# Patient Record
Sex: FEMALE | Race: WHITE | NOT HISPANIC OR LATINO | Employment: FULL TIME | URBAN - METROPOLITAN AREA
[De-identification: names, ages, dates, MRNs, and addresses within clinical notes are randomized per-mention and may not be internally consistent; named-entity substitution may affect disease eponyms.]

---

## 2023-07-29 ASSESSMENT — PROMIS GLOBAL HEALTH SCALE
CARRYOUT_PHYSICAL_ACTIVITIES: COMPLETELY
CARRYOUT_SOCIAL_ACTIVITIES: VERY GOOD
RATE_QUALITY_OF_LIFE: VERY GOOD
RATE_AVERAGE_FATIGUE: MILD
EMOTIONAL_PROBLEMS: RARELY
RATE_AVERAGE_PAIN: 2
RATE_MENTAL_HEALTH: VERY GOOD
RATE_GENERAL_HEALTH: VERY GOOD
RATE_SOCIAL_SATISFACTION: VERY GOOD
RATE_PHYSICAL_HEALTH: GOOD

## 2023-07-31 ENCOUNTER — OFFICE VISIT (OUTPATIENT)
Dept: PRIMARY CARE | Facility: CLINIC | Age: 48
End: 2023-07-31
Payer: COMMERCIAL

## 2023-07-31 VITALS — SYSTOLIC BLOOD PRESSURE: 121 MMHG | DIASTOLIC BLOOD PRESSURE: 71 MMHG | BODY MASS INDEX: 37.12 KG/M2 | WEIGHT: 237 LBS

## 2023-07-31 DIAGNOSIS — R73.02 GLUCOSE INTOLERANCE (IMPAIRED GLUCOSE TOLERANCE): ICD-10-CM

## 2023-07-31 DIAGNOSIS — E78.2 MIXED HYPERLIPIDEMIA: ICD-10-CM

## 2023-07-31 DIAGNOSIS — Z00.00 HEALTH CARE MAINTENANCE: Primary | ICD-10-CM

## 2023-07-31 DIAGNOSIS — Z12.31 VISIT FOR SCREENING MAMMOGRAM: ICD-10-CM

## 2023-07-31 LAB
ANION GAP IN SER/PLAS: 12 MMOL/L (ref 10–20)
CALCIUM (MG/DL) IN SER/PLAS: 9.5 MG/DL (ref 8.6–10.6)
CARBON DIOXIDE, TOTAL (MMOL/L) IN SER/PLAS: 29 MMOL/L (ref 21–32)
CHLORIDE (MMOL/L) IN SER/PLAS: 106 MMOL/L (ref 98–107)
CHOLESTEROL (MG/DL) IN SER/PLAS: 165 MG/DL (ref 0–199)
CHOLESTEROL IN HDL (MG/DL) IN SER/PLAS: 54.2 MG/DL
CHOLESTEROL/HDL RATIO: 3
CREATININE (MG/DL) IN SER/PLAS: 0.74 MG/DL (ref 0.5–1.05)
ERYTHROCYTE DISTRIBUTION WIDTH (RATIO) BY AUTOMATED COUNT: 12.4 % (ref 11.5–14.5)
ERYTHROCYTE MEAN CORPUSCULAR HEMOGLOBIN CONCENTRATION (G/DL) BY AUTOMATED: 31.6 G/DL (ref 32–36)
ERYTHROCYTE MEAN CORPUSCULAR VOLUME (FL) BY AUTOMATED COUNT: 95 FL (ref 80–100)
ERYTHROCYTES (10*6/UL) IN BLOOD BY AUTOMATED COUNT: 4.79 X10E12/L (ref 4–5.2)
ESTIMATED AVERAGE GLUCOSE FOR HBA1C: 103 MG/DL
GFR FEMALE: >90 ML/MIN/1.73M2
GLUCOSE (MG/DL) IN SER/PLAS: 92 MG/DL (ref 74–99)
HEMATOCRIT (%) IN BLOOD BY AUTOMATED COUNT: 45.3 % (ref 36–46)
HEMOGLOBIN (G/DL) IN BLOOD: 14.3 G/DL (ref 12–16)
HEMOGLOBIN A1C/HEMOGLOBIN TOTAL IN BLOOD: 5.2 %
LDL: 73 MG/DL (ref 0–99)
LEUKOCYTES (10*3/UL) IN BLOOD BY AUTOMATED COUNT: 6.3 X10E9/L (ref 4.4–11.3)
NRBC (PER 100 WBCS) BY AUTOMATED COUNT: 0 /100 WBC (ref 0–0)
PLATELETS (10*3/UL) IN BLOOD AUTOMATED COUNT: 234 X10E9/L (ref 150–450)
POTASSIUM (MMOL/L) IN SER/PLAS: 4.3 MMOL/L (ref 3.5–5.3)
SODIUM (MMOL/L) IN SER/PLAS: 143 MMOL/L (ref 136–145)
TRIGLYCERIDE (MG/DL) IN SER/PLAS: 188 MG/DL (ref 0–149)
UREA NITROGEN (MG/DL) IN SER/PLAS: 9 MG/DL (ref 6–23)
VLDL: 38 MG/DL (ref 0–40)

## 2023-07-31 PROCEDURE — 85027 COMPLETE CBC AUTOMATED: CPT

## 2023-07-31 PROCEDURE — 83036 HEMOGLOBIN GLYCOSYLATED A1C: CPT

## 2023-07-31 PROCEDURE — 80061 LIPID PANEL: CPT

## 2023-07-31 PROCEDURE — 93000 ELECTROCARDIOGRAM COMPLETE: CPT | Performed by: INTERNAL MEDICINE

## 2023-07-31 PROCEDURE — 80048 BASIC METABOLIC PNL TOTAL CA: CPT

## 2023-07-31 PROCEDURE — 99396 PREV VISIT EST AGE 40-64: CPT | Performed by: INTERNAL MEDICINE

## 2023-07-31 NOTE — PROGRESS NOTES
Subjective   Patient ID: Ariana De Santiago is a 47 y.o. female who presents for No chief complaint on file..    HPI CPE see updated front sheet no chest pain no shortness of breath no polyuria polydipsia no side effect with over-the-counter medications including vitamins bowels normal no dysuria    Past medical history hyperlipidemia    Medications multivitamin    Allergies noted and unchanged    Social history no tobacco    Family history both parents with diabetes and hypertension    Prevention exercises especially during the summertime some prior blood work reviewed including slight elevations in triglycerides and glucose due for mammogram    Review of Systems    Objective   There were no vitals taken for this visit.    Physical Exam vital signs noted alert and oriented x3 NCAT PERRLA EOMI nares without discharge OP benign TM normal bilateral EAC clear bilateral no AC nodes no JVD or bruit no thyromegaly chest clear to auscultation and percussion CV regular rate and rhythm S1-S2 without murmur gallop or rub abdomen soft obese nontender normal active bowel sounds LS spine normal curvature negative straight leg raise negative logroll negative SI joint tenderness extremities no clubbing cyanosis or edema normal distal pulses DTR 2+    Assessment/Plan impression General medical examination hyperlipidemia glucose intolerance obesity  Plan also was having some headaches she thought from COVID they are treated with a simple Motrin and are okay mostly towards the left side no visual changes also with occasional dyspepsia takes a Tums seems to be okay also she thought perhaps from COVID check EKG advised on heart diet weight loss exercise increase water consumption check Chem-7 advised on glucose potassium and kidney function okay for mammogram requisition made check CBC advised on blood count check A1c advised on long-term blood sugar test check lipid panel advised on cholesterol profile and triglycerides follow-up based on  labs TT 60 cc 31

## 2023-10-30 ENCOUNTER — TELEMEDICINE CLINICAL SUPPORT (OUTPATIENT)
Dept: PREADMISSION TESTING | Facility: HOSPITAL | Age: 48
End: 2023-10-30
Payer: COMMERCIAL

## 2023-11-03 ENCOUNTER — PRE-ADMISSION TESTING (OUTPATIENT)
Dept: PREADMISSION TESTING | Facility: HOSPITAL | Age: 48
End: 2023-11-03
Payer: COMMERCIAL

## 2023-11-03 VITALS
TEMPERATURE: 97.8 F | HEIGHT: 67 IN | SYSTOLIC BLOOD PRESSURE: 101 MMHG | OXYGEN SATURATION: 97 % | DIASTOLIC BLOOD PRESSURE: 69 MMHG | WEIGHT: 237.22 LBS | HEART RATE: 75 BPM | BODY MASS INDEX: 37.23 KG/M2

## 2023-11-03 DIAGNOSIS — Z01.818 PREOPERATIVE TESTING: Primary | ICD-10-CM

## 2023-11-03 LAB
ABO GROUP (TYPE) IN BLOOD: NORMAL
ANION GAP SERPL CALC-SCNC: 15 MMOL/L (ref 10–20)
ANTIBODY SCREEN: NORMAL
BUN SERPL-MCNC: 10 MG/DL (ref 6–23)
CALCIUM SERPL-MCNC: 10 MG/DL (ref 8.6–10.6)
CHLORIDE SERPL-SCNC: 104 MMOL/L (ref 98–107)
CO2 SERPL-SCNC: 27 MMOL/L (ref 21–32)
CREAT SERPL-MCNC: 0.58 MG/DL (ref 0.5–1.05)
ERYTHROCYTE [DISTWIDTH] IN BLOOD BY AUTOMATED COUNT: 11.9 % (ref 11.5–14.5)
GFR SERPL CREATININE-BSD FRML MDRD: >90 ML/MIN/1.73M*2
GLUCOSE SERPL-MCNC: 85 MG/DL (ref 74–99)
HCT VFR BLD AUTO: 43.9 % (ref 36–46)
HGB BLD-MCNC: 14.2 G/DL (ref 12–16)
MCH RBC QN AUTO: 30 PG (ref 26–34)
MCHC RBC AUTO-ENTMCNC: 32.3 G/DL (ref 32–36)
MCV RBC AUTO: 93 FL (ref 80–100)
NRBC BLD-RTO: 0 /100 WBCS (ref 0–0)
PLATELET # BLD AUTO: 206 X10*3/UL (ref 150–450)
POTASSIUM SERPL-SCNC: 4.3 MMOL/L (ref 3.5–5.3)
RBC # BLD AUTO: 4.74 X10*6/UL (ref 4–5.2)
RH FACTOR (ANTIGEN D): NORMAL
SODIUM SERPL-SCNC: 142 MMOL/L (ref 136–145)
WBC # BLD AUTO: 5.9 X10*3/UL (ref 4.4–11.3)

## 2023-11-03 PROCEDURE — 86901 BLOOD TYPING SEROLOGIC RH(D): CPT

## 2023-11-03 PROCEDURE — 99204 OFFICE O/P NEW MOD 45 MIN: CPT | Performed by: NURSE PRACTITIONER

## 2023-11-03 PROCEDURE — 85027 COMPLETE CBC AUTOMATED: CPT

## 2023-11-03 PROCEDURE — 82374 ASSAY BLOOD CARBON DIOXIDE: CPT

## 2023-11-03 PROCEDURE — 36415 COLL VENOUS BLD VENIPUNCTURE: CPT

## 2023-11-03 RX ORDER — BISMUTH SUBSALICYLATE 262 MG
1 TABLET,CHEWABLE ORAL DAILY
COMMUNITY

## 2023-11-03 RX ORDER — VIT C/E/ZN/COPPR/LUTEIN/ZEAXAN 250MG-90MG
25 CAPSULE ORAL
COMMUNITY
Start: 2014-06-19

## 2023-11-03 RX ORDER — ASCORBIC ACID 500 MG
500 TABLET ORAL DAILY
COMMUNITY

## 2023-11-03 ASSESSMENT — DUKE ACTIVITY SCORE INDEX (DASI)
CAN YOU PARTICIPATE IN STRENOUS SPORTS LIKE SWIMMING, SINGLES TENNIS, FOOTBALL, BASKETBALL, OR SKIING: YES
CAN YOU RUN A SHORT DISTANCE: YES
CAN YOU PARTICIPATE IN MODERATE RECREATIONAL ACTIVITIES LIKE GOLF, BOWLING, DANCING, DOUBLES TENNIS OR THROWING A BASEBALL OR FOOTBALL: YES
CAN YOU CLIMB A FLIGHT OF STAIRS OR WALK UP A HILL: YES
CAN YOU TAKE CARE OF YOURSELF (EAT, DRESS, BATHE, OR USE TOILET): YES
CAN YOU WALK INDOORS, SUCH AS AROUND YOUR HOUSE: YES
DASI METS SCORE: 9.9
CAN YOU HAVE SEXUAL RELATIONS: YES
CAN YOU DO HEAVY WORK AROUND THE HOUSE LIKE SCRUBBING FLOORS OR LIFTING AND MOVING HEAVY FURNITURE: YES
TOTAL_SCORE: 58.2
CAN YOU DO MODERATE WORK AROUND THE HOUSE LIKE VACUUMING, SWEEPING FLOORS OR CARRYING GROCERIES: YES
CAN YOU DO YARD WORK LIKE RAKING LEAVES, WEEDING OR PUSHING A MOWER: YES
CAN YOU DO LIGHT WORK AROUND THE HOUSE LIKE DUSTING OR WASHING DISHES: YES
CAN YOU WALK A BLOCK OR TWO ON LEVEL GROUND: YES

## 2023-11-03 ASSESSMENT — ENCOUNTER SYMPTOMS
MUSCULOSKELETAL NEGATIVE: 1
NECK NEGATIVE: 1
GASTROINTESTINAL NEGATIVE: 1
CARDIOVASCULAR NEGATIVE: 1
NEUROLOGICAL NEGATIVE: 1
ENDOCRINE NEGATIVE: 1
CONSTITUTIONAL NEGATIVE: 1
EYES NEGATIVE: 1
RESPIRATORY NEGATIVE: 1

## 2023-11-03 ASSESSMENT — CHADS2 SCORE
CHF: NO
DIABETES: NO
HYPERTENSION: NO
AGE GREATER THAN OR EQUAL TO 75: NO
CHADS2 SCORE: 0
PRIOR STROKE OR TIA OR THROMBOEMBOLISM: NO

## 2023-11-03 ASSESSMENT — LIFESTYLE VARIABLES: SMOKING_STATUS: NONSMOKER

## 2023-11-03 NOTE — PREPROCEDURE INSTRUCTIONS
NPO Instructions:    Do not eat any food after midnight the night before your surgery/procedure.  You may have up to TEN ounces of clear liquids until TWO hours before surgery/procedure. This includes water, black tea/coffee, (no milk or cream), apple juice, and/or electrolyte drinks (Gatorade).  Yo may chew gum up to TWO hours before your surgery/procedure.    Additional Instructions:     Avoid herbal supplements, multivitamins and NSAIDS (non-steroidal anti-inflammatory drugs) such as Advil, Aleve, Ibuprofen, Naproxen, Excedrin, Meloxicam or Celebrex for at least 7 days prior to surgery. May take Tylenol as needed.    Seven/Six Days before Surgery:  Review your medication instructions, stop indicated medications    Day of Surgery:  Review your medication instructions, take indicated medications  Wear comfortable loose fitting clothing  Do not use moisturizers, creams, lotions or perfume  All jewelry and valuables should be left at home    Linda Lujan [facility]  [facility]  [phone]

## 2023-11-03 NOTE — CPM/PAT H&P
CPM/PAT Evaluation       Name: Ariana De Santiago (Ariana De Santiago)  /Age: 1975/48 y.o.     Visit Type:   In-Person       Chief Complaint: endometriosis    HPI  The patient is a 48 year old female with endometriosis now s/p TLH-BS and off of NE due to worsened migraines. Recent MRI showed suspicion for left endometrioma, possible right endometrioma vs hemorrhagic cyst. Suspected endometriotic implant vs primary urinary bladder dome neoplasm (though less likely) suspected measuring 1cm at dome of bladder and to left of mildline. Lesion is nodular and extends cephalad, rather than caudally into the dome of the bladder. She is interested in proceeding with surgical intervention. She currently denies fever, chills, n/v, abdominal pain, chest pain, sob, or change in bowel or bladder. She presents today for perioperative evaluation in anticipation of robotic excision of endometriosis on 23 with Dr. Lombardo.    Past Medical History:   Diagnosis Date    Endometriosis 2023    Ob/Gyn: Loren Lombardo, endometriosis s/p TL-BS presents to discuss surgical planning.    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    Family History   Problem Relation Name Age of Onset    Osteoporosis  Mother      Diabetes Father      Heart disease Father         No Known Allergies    Prior to Admission medications    Medication Sig Start Date End Date Taking? Authorizing Provider   cholecalciferol (Vitamin D-3) 25 MCG (1000 UT) capsule Take 1 capsule (25 mcg) by mouth once daily. 6/19/14  Yes Historical Provider, MD   ascorbic acid (Vitamin C) 500 mg tablet Take 1 tablet (500 mg) by mouth once daily.    Historical Provider, MD   multivitamin tablet Take 1 tablet by mouth once daily.    Historical Provider, MD ALLISON ROS:   Constitutional:   neg    Neuro/Psych:   neg    Eyes:    Wears contacts   neg     use of corrective lenses  Ears:   neg    Nose:   neg    Mouth:   neg    Throat:   neg    Neck:   neg    Cardio:   neg    Respiratory:   neg    Endocrine:   neg    GI:   neg    :   neg    Musculoskeletal:   neg    Hematologic:   neg    Skin:  neg        Physical Exam  Vitals reviewed.   Constitutional:       Appearance: Normal appearance. She is obese.   HENT:      Head: Normocephalic and atraumatic.      Nose: Nose normal.      Mouth/Throat:      Mouth: Mucous membranes are moist.      Pharynx: Oropharynx is clear.   Eyes:      Extraocular Movements: Extraocular movements intact.      Conjunctiva/sclera: Conjunctivae normal.      Pupils: Pupils are equal, round, and reactive to light.   Cardiovascular:      Rate and Rhythm: Normal rate and regular rhythm.      Pulses: Normal pulses.      Heart sounds: Normal heart sounds.   Pulmonary:      Effort: Pulmonary effort is normal.      Breath sounds: Normal breath sounds.   Musculoskeletal:         General: Normal range of motion.      Cervical back: Normal range of motion.   Skin:     General: Skin is warm and dry.   Neurological:      General: No focal deficit present.      Mental Status: She is alert and oriented to person, place, and time.   Psychiatric:         Mood and Affect: Mood normal.         Behavior: Behavior normal.          PAT AIRWAY:   Airway:      Mallampati::  I    TM distance::  >3 FB    Neck ROM::  Full  normal        Visit Vitals  /69   Pulse 75   Temp 36.6 °C (97.8 °F) (Oral)       DASI Risk Score      Flowsheet Row Most Recent Value   DASI SCORE 58.2   METS Score (Will be calculated only when all the questions are answered) 9.9          Caprini DVT Assessment      Flowsheet Row Most Recent Value   DVT Score 10   Current Status Major surgery planned, lasting over 3 hours   History Prior major surgery   Age 40-59 years   BMI 31-40 (Obesity)          Modified Frailty Index      Flowsheet Row Most Recent Value   Modified Frailty Index Calculator 0          CHADS2 Stroke Risk  Current as of yesterday        N/A 3 - 100%: High Risk   2 - 3%: Medium Risk   0 - 2%: Low Risk     Last Change: N/A          This score determines the patient's risk of having a stroke if the patient has atrial fibrillation.        This score is not applicable to this patient. Components are not calculated.          Revised Cardiac Risk Index      Flowsheet Row Most Recent Value   Revised Cardiac Risk Calculator 0          Apfel Simplified Score      Flowsheet Row Most Recent Value   Apfel Simplified Score Calculator 3          Risk Analysis Index Results This Encounter    No data found in the last 1 encounters.       Stop Bang Score      Flowsheet Row Most Recent Value   Do you snore loudly? 0   Do you often feel tired or fatigued after your sleep? 0   Has anyone ever observed you stop breathing in your sleep? 0   Do you have or are you being treated for high blood pressure? 0   Recent BMI (Calculated) 38.1   Is BMI greater than 35 kg/m2? 1=Yes   Age older than 50 years old? 0=No   Is your neck circumference greater than 17 inches (Male) or 16 inches (Female)? 1   Gender - Male 0=No   STOP-BANG Total Score 2          Results for orders placed or performed in visit on 11/03/23 (from the past 96 hour(s))   Basic Metabolic Panel   Result Value Ref Range    Glucose 85 74 - 99 mg/dL     Sodium 142 136 - 145 mmol/L    Potassium 4.3 3.5 - 5.3 mmol/L    Chloride 104 98 - 107 mmol/L    Bicarbonate 27 21 - 32 mmol/L    Anion Gap 15 10 - 20 mmol/L    Urea Nitrogen 10 6 - 23 mg/dL    Creatinine 0.58 0.50 - 1.05 mg/dL    eGFR >90 >60 mL/min/1.73m*2    Calcium 10.0 8.6 - 10.6 mg/dL   Type And Screen   Result Value Ref Range    ABO TYPE O     Rh TYPE POS     ANTIBODY SCREEN NEG    CBC   Result Value Ref Range    WBC 5.9 4.4 - 11.3 x10*3/uL    nRBC 0.0 0.0 - 0.0 /100 WBCs    RBC 4.74 4.00 - 5.20 x10*6/uL    Hemoglobin 14.2 12.0 - 16.0 g/dL    Hematocrit 43.9 36.0 - 46.0 %    MCV 93 80 - 100 fL    MCH 30.0 26.0 - 34.0 pg    MCHC 32.3 32.0 - 36.0 g/dL    RDW 11.9 11.5 - 14.5 %    Platelets 206 150 - 450 x10*3/uL        Diagnostic Results      EKG       Assessment and Plan:     Neuro:   The patient has no neurological diagnoses or significant findings on chart review, clinical presentation, and evaluation.  No grossly apparent perioperative risk. The patient is at increased risk for perioperative stroke secondary to female gender, general anesthesia, operative time >2.5 hours.    HEENT/Airway  No diagnoses, significant findings on chart review, clinical presentation, or evaluation.    Cardiovascular  The patient is scheduled for non-cardiac surgery associated with elevated risk.  The patient has no major cardiac contraindications to non- cardiac surgery.  RCRI  The patient meets 0-1 RCRI criteria and therefore has a less than 1% risk of major adverse cardiac complications.  EKG  The patient has no EKG or echocardiographic changes concerning for myocardial ischemia.  No further cardiac evaluation is indicated  Heart Failure  The patient has no known history of heart failure.  Additionally, the patient reports no symptoms of heart failure and demonstrates no signs of heart failure.  Hypertension Evaluation  The patient has no known history of hypertension and has a normal blood pressure today.  Heart Rhythm  Evaluation  The patient has no history of arrhythmias.  Heart Valve Evaluation  The patient has no known history of valvular heart disease. The patient has no symptoms or physical exam findings to suggest valvular heart disease.  CARDS EVAL  The patient is not followed by cardiology.  -The patient has a 30-day risk for MACE of 1 predictor, 6.0% risk for cardiac death, nonfatal myocardial infarction, and nonfatal cardiac arrest  Pulmonary   No significant findings on chart review or clinical presentation and evaluation. The patient is at increased risk of perioperative pulmonary complications secondary to morbid obesity.  The patient has a stop bang score of 2, which places patient at low risk for having MIGUEL.  ARISCAT 23, low, 1.6% risk of in-hospital postoperative pulmonary complications  PRODIGY 3, low of respiratory depression episode.    Hematology  No diagnoses or significant findings on chart review or clinical presentation and evaluation.  Antiplatelet management   The patient is not currently receiving antiplatelet therapy.  Anticoagulation management  The patient is not currently receiving anticoagulation therapy.  Caprini score 10, high risk of VTE  Transfusion Evaluation  A type and screen was obtained given the likelihood for perioperative transfusion of blood or blood products.    GI  No diagnoses or significant findings on chart review or clinical presentation and evaluation.  Eat 10- 0    Genitourinary  No diagnoses or significant findings on chart review or clinical presentation and evaluation.    Renal  The patient has no known history of chronic kidney disease. No renal diagnoses or significant findings on chart review or clinical presentation and evaluation.    Musculoskeletal  No diagnoses or significant findings on chart review or clinical presentation and evaluation.    Endocrine  Diabetes Evaluation  The patient has no history of diabetes mellitus  Thyroid Disease Evaluation  The patient has no  history of thyroid disease.    ID  No diagnoses or significant findings on chart review or clinical presentation and evaluation.    -Preoperative medication instructions were provided and reviewed with the patient.  Any additional testing or evaluation was explained to the patient.  NPO Instructions were discussed, and the patient's questions were answered prior to conclusion of this encounter

## 2023-11-15 ENCOUNTER — ANESTHESIA EVENT (OUTPATIENT)
Dept: OPERATING ROOM | Facility: HOSPITAL | Age: 48
End: 2023-11-15
Payer: COMMERCIAL

## 2023-11-15 NOTE — H&P
"Pre- Surgical History and Physical    Ariana De Santiago is a 48 y.o. P2 ( x2) now s/p TLH-BS presenting for robotic excision of endometriosis     Labs (): Hgb. 14.2, Cr. 0.58     Obhx:  x2  Gynhx: Hysterectomy (2018). Last Pap 2016 negative/negative   PSH: laparoscopy x 3 for endometriosis, TLH-BS. 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 Only peritoneal nodule was resected with regard to endometriosis. Path positive       IMAGING:    MR Pelvis (2023)    FINDINGS:  OVARIES     Right     Size (in mm):  27 mm craniocaudal, 28 x 15. This measurement includes  both cysts detailed below     Vascularity:  Maintained; no evidence of acute fixed torsion     Solid Mass:  Negative     Cysts/s:     18 mm craniocaudal, 19 x 10 mm hemorrhagic cyst. See the impression  of this report     9-10 mm simple, physiologic follicle or cyst     LEFT     Size (in mm):  25 mm craniocaudal, 28 x 22 mm in the axial plane.  This measurement includes both cysts detailed below     Vascularity:  Maintained; no evidence of acute fixed torsion     Solid Mass:  Negative     Cysts/s:     19 mm craniocaudal, 22 x 20 mm hemorrhagic cyst. See the impression  of this report     14 mm simple, physiologic follicle or cyst     OTHER PELVIS     Lymph Nodes:  No pelvic adenopathy     Endometriosis Findings:  Around 1 cm nodular lesion sitting on the mid to posterior bladder dome to the left of the midline could be related to endometriosis. See also the ovaries section, above, and the impression, below     Free Fluid:  None pathologic     Nongynecologic findings:  No acute or contributory abnormality. The  included bowel, the included urinary bladder and vasculature is  within expected limits     IMPRESSION:  ONE FAIRLY SMALL HEMORRHAGIC CYST IN EACH OVARY. THE LARGER IS ON THE  LEFT AND IT IS THE LEFT OVARIAN HEMORRHAGIC CYSTIC LESION THAT HAS  SO-CALLED \"T2 SHADING,\" AS WELL AS POTENTIALLY (NOT NEARLY " "AS  COMPELLING AS THE T2 SHADING) ONE OR TWO \"T2 DARK SPOT SIGN/S,\" BOTH  MR FINDINGS ASSOCIATED WITH ENDOMETRIOMAS. THE SMALLER IS ON THE  RIGHT AND DOES NOT HAVE EITHER OF THOSE FINDINGS, WHICH DOES NOT  EXCLUDE A RIGHT-SIDED ENDOMETRIOMA     AROUND 1 CM NODULAR, ENHANCING ABNORMALITY SITTING ON THE DOME OF THE  URINARY BLADDER TO THE LEFT OF THE MIDLINE MAY BE RELATED TO AN  ENDOMETRIOSIS DEPOSIT. I FIND THE POSSIBILITY OF A PRIMARY URINARY  BLADDER DOME NEOPLASM TO BE LESS LIKELY BASED ON POSITION AND  RELATION TO THE LUMEN (IT PROJECTS UP, CEPHALAD, AWAY FROM THE DOME  OF THE URINARY BLADDER, NOT DOWN, CAUDALLY INTO THE BLADDER LUMEN)     NO OTHER POTENTIAL PELVIC FINDINGS OF ENDOMETRIOSIS     NOTE THAT DUE TO THE PATIENT'S SIZE AND PROTOCOL USED FOR THIS EXAM,  SOME OF THE SUPERFICIAL VENTRAL PELVIC WALL IS NOT INCLUDED IN THE  FIELD OF VIEW, MAKING EVALUATION FOR SCAR ENDOMETRIOSIS DIFFICULT BUT  WITH THE PORTIONS OF THE VENTRAL PELVIC WALL INCLUDED, SCAR  ENDOMETRIOSIS SEEMS UNLIKELY AS THERE IS NO FINDING ASSOCIATED WITH  ENDOMETRIOSIS ANYWHERE NEAR THE RECTUS OR SEVERAL CM SUPERFICIAL TO  THE RECTUS ANYWHERE IN THE FIELD OF VIEW       Obstetrical History   OB History   No obstetric history on file.       Past Medical History  Past Medical History:   Diagnosis Date    Endometriosis 09/21/2023    Ob/Gyn: Loren Lombardo, endometriosis s/p TL-BS presents to discuss surgical planning.    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        Past Surgical History   Past Surgical History:   Procedure Laterality Date    " HYSTERECTOMY      OTHER SURGICAL HISTORY  01/29/2019    Hysterectomy    OTHER SURGICAL HISTORY  11/26/2014    Ostectomy Calcaneus For Spur    OTHER SURGICAL HISTORY  11/26/2014    Gynecologic Laparoscopy With Adhesiolysis       Social History  Social History     Tobacco Use    Smoking status: Never    Smokeless tobacco: Never   Substance Use Topics    Alcohol use: Never     Substance and Sexual Activity   Drug Use Never       Allergies  Patient has no known allergies.     Medications  No medications prior to admission.       Objective    Last Vitals  Temp Pulse Resp BP MAP O2 Sat                   Physical Examination  General: no acute distress  HEENT: normocephalic, atraumatic  CV: warm and well perfused  Lungs: breathing comfortably on room air  Abdomen: NTND  Extremities: moving all extremities spontaneously  Neuro: awake and conversant  Psych: appropriate mood and affect      Lab Review  Labs in chart were reviewed.    To be seen & dw. Dr. Grupo Ibarra MD  PGY-1, Obstetrics & Gynecology   [facility]

## 2023-11-16 ENCOUNTER — HOSPITAL ENCOUNTER (OUTPATIENT)
Facility: HOSPITAL | Age: 48
Setting detail: OUTPATIENT SURGERY
Discharge: HOME | End: 2023-11-16
Attending: STUDENT IN AN ORGANIZED HEALTH CARE EDUCATION/TRAINING PROGRAM | Admitting: STUDENT IN AN ORGANIZED HEALTH CARE EDUCATION/TRAINING PROGRAM
Payer: COMMERCIAL

## 2023-11-16 ENCOUNTER — ANESTHESIA (OUTPATIENT)
Dept: OPERATING ROOM | Facility: HOSPITAL | Age: 48
End: 2023-11-16
Payer: COMMERCIAL

## 2023-11-16 VITALS
BODY MASS INDEX: 37.58 KG/M2 | WEIGHT: 239.42 LBS | HEART RATE: 79 BPM | RESPIRATION RATE: 14 BRPM | SYSTOLIC BLOOD PRESSURE: 130 MMHG | HEIGHT: 67 IN | OXYGEN SATURATION: 95 % | DIASTOLIC BLOOD PRESSURE: 88 MMHG | TEMPERATURE: 97.7 F

## 2023-11-16 DIAGNOSIS — N80.9 ENDOMETRIOSIS, UNSPECIFIED: ICD-10-CM

## 2023-11-16 DIAGNOSIS — N80.9 ENDOMETRIOSIS: Primary | ICD-10-CM

## 2023-11-16 PROBLEM — E66.9 OBESITY: Status: ACTIVE | Noted: 2023-11-16

## 2023-11-16 PROBLEM — Z98.890 HISTORY OF GENERAL ANESTHESIA: Status: ACTIVE | Noted: 2023-11-16

## 2023-11-16 PROCEDURE — 2500000001 HC RX 250 WO HCPCS SELF ADMINISTERED DRUGS (ALT 637 FOR MEDICARE OP)

## 2023-11-16 PROCEDURE — 3600000018 HC OR TIME - INITIAL BASE CHARGE - PROCEDURE LEVEL SIX: Performed by: STUDENT IN AN ORGANIZED HEALTH CARE EDUCATION/TRAINING PROGRAM

## 2023-11-16 PROCEDURE — 2500000001 HC RX 250 WO HCPCS SELF ADMINISTERED DRUGS (ALT 637 FOR MEDICARE OP): Performed by: STUDENT IN AN ORGANIZED HEALTH CARE EDUCATION/TRAINING PROGRAM

## 2023-11-16 PROCEDURE — 2500000004 HC RX 250 GENERAL PHARMACY W/ HCPCS (ALT 636 FOR OP/ED): Performed by: STUDENT IN AN ORGANIZED HEALTH CARE EDUCATION/TRAINING PROGRAM

## 2023-11-16 PROCEDURE — 2500000005 HC RX 250 GENERAL PHARMACY W/O HCPCS

## 2023-11-16 PROCEDURE — 7100000001 HC RECOVERY ROOM TIME - INITIAL BASE CHARGE: Performed by: STUDENT IN AN ORGANIZED HEALTH CARE EDUCATION/TRAINING PROGRAM

## 2023-11-16 PROCEDURE — 49321 LAPAROSCOPY BIOPSY: CPT | Performed by: STUDENT IN AN ORGANIZED HEALTH CARE EDUCATION/TRAINING PROGRAM

## 2023-11-16 PROCEDURE — 96372 THER/PROPH/DIAG INJ SC/IM: CPT

## 2023-11-16 PROCEDURE — 88305 TISSUE EXAM BY PATHOLOGIST: CPT | Mod: TC,SUR | Performed by: STUDENT IN AN ORGANIZED HEALTH CARE EDUCATION/TRAINING PROGRAM

## 2023-11-16 PROCEDURE — S2900 ROBOTIC SURGICAL SYSTEM: HCPCS | Performed by: STUDENT IN AN ORGANIZED HEALTH CARE EDUCATION/TRAINING PROGRAM

## 2023-11-16 PROCEDURE — A4217 STERILE WATER/SALINE, 500 ML: HCPCS | Performed by: STUDENT IN AN ORGANIZED HEALTH CARE EDUCATION/TRAINING PROGRAM

## 2023-11-16 PROCEDURE — 2500000004 HC RX 250 GENERAL PHARMACY W/ HCPCS (ALT 636 FOR OP/ED)

## 2023-11-16 PROCEDURE — 3700000002 HC GENERAL ANESTHESIA TIME - EACH INCREMENTAL 1 MINUTE: Performed by: STUDENT IN AN ORGANIZED HEALTH CARE EDUCATION/TRAINING PROGRAM

## 2023-11-16 PROCEDURE — 7100000002 HC RECOVERY ROOM TIME - EACH INCREMENTAL 1 MINUTE: Performed by: STUDENT IN AN ORGANIZED HEALTH CARE EDUCATION/TRAINING PROGRAM

## 2023-11-16 PROCEDURE — 88305 TISSUE EXAM BY PATHOLOGIST: CPT | Performed by: PATHOLOGY

## 2023-11-16 PROCEDURE — 2720000007 HC OR 272 NO HCPCS: Performed by: STUDENT IN AN ORGANIZED HEALTH CARE EDUCATION/TRAINING PROGRAM

## 2023-11-16 PROCEDURE — 2780000003 HC OR 278 NO HCPCS: Performed by: STUDENT IN AN ORGANIZED HEALTH CARE EDUCATION/TRAINING PROGRAM

## 2023-11-16 PROCEDURE — 7100000010 HC PHASE TWO TIME - EACH INCREMENTAL 1 MINUTE: Performed by: STUDENT IN AN ORGANIZED HEALTH CARE EDUCATION/TRAINING PROGRAM

## 2023-11-16 PROCEDURE — 3600000017 HC OR TIME - EACH INCREMENTAL 1 MINUTE - PROCEDURE LEVEL SIX: Performed by: STUDENT IN AN ORGANIZED HEALTH CARE EDUCATION/TRAINING PROGRAM

## 2023-11-16 PROCEDURE — A58662 PR LAP,FULGURATE/EXCISE LESIONS: Performed by: ANESTHESIOLOGY

## 2023-11-16 PROCEDURE — 7100000009 HC PHASE TWO TIME - INITIAL BASE CHARGE: Performed by: STUDENT IN AN ORGANIZED HEALTH CARE EDUCATION/TRAINING PROGRAM

## 2023-11-16 PROCEDURE — 3700000001 HC GENERAL ANESTHESIA TIME - INITIAL BASE CHARGE: Performed by: STUDENT IN AN ORGANIZED HEALTH CARE EDUCATION/TRAINING PROGRAM

## 2023-11-16 PROCEDURE — 2500000005 HC RX 250 GENERAL PHARMACY W/O HCPCS: Performed by: STUDENT IN AN ORGANIZED HEALTH CARE EDUCATION/TRAINING PROGRAM

## 2023-11-16 RX ORDER — HYDRALAZINE HYDROCHLORIDE 20 MG/ML
5 INJECTION INTRAMUSCULAR; INTRAVENOUS EVERY 30 MIN PRN
Status: DISCONTINUED | OUTPATIENT
Start: 2023-11-16 | End: 2023-11-16 | Stop reason: HOSPADM

## 2023-11-16 RX ORDER — PHENAZOPYRIDINE HYDROCHLORIDE 100 MG/1
100 TABLET, FILM COATED ORAL 3 TIMES DAILY PRN
Qty: 10 TABLET | Refills: 0 | Status: SHIPPED | OUTPATIENT
Start: 2023-11-16

## 2023-11-16 RX ORDER — ONDANSETRON HYDROCHLORIDE 2 MG/ML
4 INJECTION, SOLUTION INTRAVENOUS ONCE AS NEEDED
Status: DISCONTINUED | OUTPATIENT
Start: 2023-11-16 | End: 2023-11-16 | Stop reason: HOSPADM

## 2023-11-16 RX ORDER — HEPARIN SODIUM 5000 [USP'U]/ML
5000 INJECTION, SOLUTION INTRAVENOUS; SUBCUTANEOUS ONCE
Status: COMPLETED | OUTPATIENT
Start: 2023-11-16 | End: 2023-11-16

## 2023-11-16 RX ORDER — OXYCODONE HYDROCHLORIDE 5 MG/1
5 TABLET ORAL EVERY 4 HOURS PRN
Status: DISCONTINUED | OUTPATIENT
Start: 2023-11-16 | End: 2023-11-16 | Stop reason: HOSPADM

## 2023-11-16 RX ORDER — GABAPENTIN 600 MG/1
600 TABLET ORAL ONCE
Status: COMPLETED | OUTPATIENT
Start: 2023-11-16 | End: 2023-11-16

## 2023-11-16 RX ORDER — SODIUM CHLORIDE, SODIUM LACTATE, POTASSIUM CHLORIDE, CALCIUM CHLORIDE 600; 310; 30; 20 MG/100ML; MG/100ML; MG/100ML; MG/100ML
INJECTION, SOLUTION INTRAVENOUS CONTINUOUS PRN
Status: DISCONTINUED | OUTPATIENT
Start: 2023-11-16 | End: 2023-11-16

## 2023-11-16 RX ORDER — ROCURONIUM BROMIDE 10 MG/ML
INJECTION, SOLUTION INTRAVENOUS AS NEEDED
Status: DISCONTINUED | OUTPATIENT
Start: 2023-11-16 | End: 2023-11-16

## 2023-11-16 RX ORDER — SODIUM CHLORIDE 0.9 G/100ML
IRRIGANT IRRIGATION AS NEEDED
Status: DISCONTINUED | OUTPATIENT
Start: 2023-11-16 | End: 2023-11-16 | Stop reason: HOSPADM

## 2023-11-16 RX ORDER — DEXAMETHASONE SODIUM PHOSPHATE 4 MG/ML
INJECTION, SOLUTION INTRA-ARTICULAR; INTRALESIONAL; INTRAMUSCULAR; INTRAVENOUS; SOFT TISSUE AS NEEDED
Status: DISCONTINUED | OUTPATIENT
Start: 2023-11-16 | End: 2023-11-16

## 2023-11-16 RX ORDER — IBUPROFEN 600 MG/1
600 TABLET ORAL EVERY 6 HOURS PRN
Qty: 20 TABLET | Refills: 0 | Status: SHIPPED | OUTPATIENT
Start: 2023-11-16 | End: 2023-11-26

## 2023-11-16 RX ORDER — CELECOXIB 200 MG/1
400 CAPSULE ORAL ONCE
Status: COMPLETED | OUTPATIENT
Start: 2023-11-16 | End: 2023-11-16

## 2023-11-16 RX ORDER — HYDROMORPHONE HYDROCHLORIDE 1 MG/ML
0.2 INJECTION, SOLUTION INTRAMUSCULAR; INTRAVENOUS; SUBCUTANEOUS EVERY 5 MIN PRN
Status: DISCONTINUED | OUTPATIENT
Start: 2023-11-16 | End: 2023-11-16 | Stop reason: HOSPADM

## 2023-11-16 RX ORDER — BUPIVACAINE HYDROCHLORIDE 5 MG/ML
INJECTION, SOLUTION PERINEURAL AS NEEDED
Status: DISCONTINUED | OUTPATIENT
Start: 2023-11-16 | End: 2023-11-16 | Stop reason: HOSPADM

## 2023-11-16 RX ORDER — PROPOFOL 10 MG/ML
INJECTION, EMULSION INTRAVENOUS AS NEEDED
Status: DISCONTINUED | OUTPATIENT
Start: 2023-11-16 | End: 2023-11-16

## 2023-11-16 RX ORDER — LIDOCAINE HYDROCHLORIDE 10 MG/ML
0.1 INJECTION, SOLUTION EPIDURAL; INFILTRATION; INTRACAUDAL; PERINEURAL ONCE
Status: DISCONTINUED | OUTPATIENT
Start: 2023-11-16 | End: 2023-11-16 | Stop reason: HOSPADM

## 2023-11-16 RX ORDER — OXYCODONE HYDROCHLORIDE 5 MG/1
5 TABLET ORAL EVERY 6 HOURS PRN
Qty: 10 TABLET | Refills: 0 | Status: SHIPPED | OUTPATIENT
Start: 2023-11-16

## 2023-11-16 RX ORDER — MIDAZOLAM HYDROCHLORIDE 1 MG/ML
INJECTION, SOLUTION INTRAMUSCULAR; INTRAVENOUS AS NEEDED
Status: DISCONTINUED | OUTPATIENT
Start: 2023-11-16 | End: 2023-11-16

## 2023-11-16 RX ORDER — ONDANSETRON HYDROCHLORIDE 2 MG/ML
INJECTION, SOLUTION INTRAVENOUS AS NEEDED
Status: DISCONTINUED | OUTPATIENT
Start: 2023-11-16 | End: 2023-11-16

## 2023-11-16 RX ORDER — LIDOCAINE HYDROCHLORIDE 20 MG/ML
INJECTION, SOLUTION INFILTRATION; PERINEURAL AS NEEDED
Status: DISCONTINUED | OUTPATIENT
Start: 2023-11-16 | End: 2023-11-16

## 2023-11-16 RX ORDER — HYDROMORPHONE HYDROCHLORIDE 1 MG/ML
0.4 INJECTION, SOLUTION INTRAMUSCULAR; INTRAVENOUS; SUBCUTANEOUS EVERY 5 MIN PRN
Status: DISCONTINUED | OUTPATIENT
Start: 2023-11-16 | End: 2023-11-16 | Stop reason: HOSPADM

## 2023-11-16 RX ORDER — ACETAMINOPHEN 325 MG/1
650 TABLET ORAL EVERY 6 HOURS PRN
Qty: 20 TABLET | Refills: 0 | Status: SHIPPED | OUTPATIENT
Start: 2023-11-16 | End: 2023-11-26

## 2023-11-16 RX ORDER — SODIUM CHLORIDE, SODIUM LACTATE, POTASSIUM CHLORIDE, CALCIUM CHLORIDE 600; 310; 30; 20 MG/100ML; MG/100ML; MG/100ML; MG/100ML
100 INJECTION, SOLUTION INTRAVENOUS CONTINUOUS
Status: DISCONTINUED | OUTPATIENT
Start: 2023-11-16 | End: 2023-11-16 | Stop reason: HOSPADM

## 2023-11-16 RX ORDER — ALBUTEROL SULFATE 0.83 MG/ML
2.5 SOLUTION RESPIRATORY (INHALATION) ONCE AS NEEDED
Status: DISCONTINUED | OUTPATIENT
Start: 2023-11-16 | End: 2023-11-16 | Stop reason: HOSPADM

## 2023-11-16 RX ORDER — SENNOSIDES 8.6 MG/1
1 TABLET ORAL 2 TIMES DAILY
Qty: 20 TABLET | Refills: 2 | Status: SHIPPED | OUTPATIENT
Start: 2023-11-16

## 2023-11-16 RX ORDER — HYDROMORPHONE HYDROCHLORIDE 1 MG/ML
INJECTION, SOLUTION INTRAMUSCULAR; INTRAVENOUS; SUBCUTANEOUS AS NEEDED
Status: DISCONTINUED | OUTPATIENT
Start: 2023-11-16 | End: 2023-11-16

## 2023-11-16 RX ORDER — OXYCODONE HYDROCHLORIDE 5 MG/1
10 TABLET ORAL EVERY 4 HOURS PRN
Status: DISCONTINUED | OUTPATIENT
Start: 2023-11-16 | End: 2023-11-16 | Stop reason: HOSPADM

## 2023-11-16 RX ORDER — LABETALOL HYDROCHLORIDE 5 MG/ML
5 INJECTION, SOLUTION INTRAVENOUS ONCE AS NEEDED
Status: DISCONTINUED | OUTPATIENT
Start: 2023-11-16 | End: 2023-11-16 | Stop reason: HOSPADM

## 2023-11-16 RX ORDER — FENTANYL CITRATE 50 UG/ML
INJECTION, SOLUTION INTRAMUSCULAR; INTRAVENOUS AS NEEDED
Status: DISCONTINUED | OUTPATIENT
Start: 2023-11-16 | End: 2023-11-16

## 2023-11-16 RX ORDER — WATER 1 ML/ML
IRRIGANT IRRIGATION AS NEEDED
Status: DISCONTINUED | OUTPATIENT
Start: 2023-11-16 | End: 2023-11-16 | Stop reason: HOSPADM

## 2023-11-16 RX ORDER — ACETAMINOPHEN 325 MG/1
975 TABLET ORAL ONCE
Status: COMPLETED | OUTPATIENT
Start: 2023-11-16 | End: 2023-11-16

## 2023-11-16 RX ORDER — ONDANSETRON 4 MG/1
4 TABLET, FILM COATED ORAL EVERY 6 HOURS PRN
Qty: 10 TABLET | Refills: 0 | Status: SHIPPED | OUTPATIENT
Start: 2023-11-16

## 2023-11-16 RX ADMIN — PROPOFOL 30 MG: 10 INJECTION, EMULSION INTRAVENOUS at 07:50

## 2023-11-16 RX ADMIN — Medication 6 L/MIN: at 12:05

## 2023-11-16 RX ADMIN — ROCURONIUM BROMIDE 70 MG: 50 INJECTION, SOLUTION INTRAVENOUS at 07:46

## 2023-11-16 RX ADMIN — PROPOFOL 20 MG: 10 INJECTION, EMULSION INTRAVENOUS at 10:30

## 2023-11-16 RX ADMIN — ROCURONIUM BROMIDE 10 MG: 50 INJECTION, SOLUTION INTRAVENOUS at 09:33

## 2023-11-16 RX ADMIN — ACETAMINOPHEN 975 MG: 325 TABLET ORAL at 06:57

## 2023-11-16 RX ADMIN — PROPOFOL 20 MG: 10 INJECTION, EMULSION INTRAVENOUS at 08:58

## 2023-11-16 RX ADMIN — PROPOFOL 30 MG: 10 INJECTION, EMULSION INTRAVENOUS at 09:54

## 2023-11-16 RX ADMIN — ROCURONIUM BROMIDE 10 MG: 50 INJECTION, SOLUTION INTRAVENOUS at 10:30

## 2023-11-16 RX ADMIN — HYDROMORPHONE HYDROCHLORIDE 0.4 MG: 1 INJECTION, SOLUTION INTRAMUSCULAR; INTRAVENOUS; SUBCUTANEOUS at 09:08

## 2023-11-16 RX ADMIN — SODIUM CHLORIDE, POTASSIUM CHLORIDE, SODIUM LACTATE AND CALCIUM CHLORIDE 100 ML/HR: 600; 310; 30; 20 INJECTION, SOLUTION INTRAVENOUS at 12:30

## 2023-11-16 RX ADMIN — PROPOFOL 130 MG: 10 INJECTION, EMULSION INTRAVENOUS at 07:43

## 2023-11-16 RX ADMIN — HYDROMORPHONE HYDROCHLORIDE 0.4 MG: 1 INJECTION, SOLUTION INTRAMUSCULAR; INTRAVENOUS; SUBCUTANEOUS at 12:41

## 2023-11-16 RX ADMIN — LIDOCAINE HYDROCHLORIDE 40 MG: 20 INJECTION, SOLUTION INFILTRATION; PERINEURAL at 07:43

## 2023-11-16 RX ADMIN — ROCURONIUM BROMIDE 5 MG: 50 INJECTION, SOLUTION INTRAVENOUS at 11:02

## 2023-11-16 RX ADMIN — ROCURONIUM BROMIDE 10 MG: 50 INJECTION, SOLUTION INTRAVENOUS at 10:08

## 2023-11-16 RX ADMIN — PROPOFOL 30 MG: 10 INJECTION, EMULSION INTRAVENOUS at 09:10

## 2023-11-16 RX ADMIN — SODIUM CHLORIDE, POTASSIUM CHLORIDE, SODIUM LACTATE AND CALCIUM CHLORIDE: 600; 310; 30; 20 INJECTION, SOLUTION INTRAVENOUS at 07:28

## 2023-11-16 RX ADMIN — PROPOFOL 20 MG: 10 INJECTION, EMULSION INTRAVENOUS at 08:43

## 2023-11-16 RX ADMIN — MIDAZOLAM 2 MG: 1 INJECTION INTRAMUSCULAR; INTRAVENOUS at 07:37

## 2023-11-16 RX ADMIN — PROPOFOL 30 MG: 10 INJECTION, EMULSION INTRAVENOUS at 09:02

## 2023-11-16 RX ADMIN — PROPOFOL 10 MG: 10 INJECTION, EMULSION INTRAVENOUS at 08:41

## 2023-11-16 RX ADMIN — DEXAMETHASONE SODIUM PHOSPHATE 4 MG: 4 INJECTION, SOLUTION INTRA-ARTICULAR; INTRALESIONAL; INTRAMUSCULAR; INTRAVENOUS; SOFT TISSUE at 07:48

## 2023-11-16 RX ADMIN — HYDROMORPHONE HYDROCHLORIDE 0.2 MG: 1 INJECTION, SOLUTION INTRAMUSCULAR; INTRAVENOUS; SUBCUTANEOUS at 12:32

## 2023-11-16 RX ADMIN — FENTANYL CITRATE 50 MCG: 50 INJECTION, SOLUTION INTRAMUSCULAR; INTRAVENOUS at 07:41

## 2023-11-16 RX ADMIN — FENTANYL CITRATE 50 MCG: 50 INJECTION, SOLUTION INTRAMUSCULAR; INTRAVENOUS at 08:25

## 2023-11-16 RX ADMIN — ONDANSETRON 4 MG: 2 INJECTION INTRAMUSCULAR; INTRAVENOUS at 11:00

## 2023-11-16 RX ADMIN — HYDROMORPHONE HYDROCHLORIDE 0.4 MG: 1 INJECTION, SOLUTION INTRAMUSCULAR; INTRAVENOUS; SUBCUTANEOUS at 09:58

## 2023-11-16 RX ADMIN — CELECOXIB 400 MG: 200 CAPSULE ORAL at 06:56

## 2023-11-16 RX ADMIN — ROCURONIUM BROMIDE 10 MG: 50 INJECTION, SOLUTION INTRAVENOUS at 09:02

## 2023-11-16 RX ADMIN — GABAPENTIN 600 MG: 600 TABLET, FILM COATED ORAL at 06:15

## 2023-11-16 RX ADMIN — PROPOFOL 30 MG: 10 INJECTION, EMULSION INTRAVENOUS at 09:33

## 2023-11-16 RX ADMIN — LIDOCAINE HYDROCHLORIDE 60 MG: 20 INJECTION, SOLUTION INFILTRATION; PERINEURAL at 07:42

## 2023-11-16 RX ADMIN — HEPARIN SODIUM 5000 UNITS: 5000 INJECTION, SOLUTION INTRAVENOUS; SUBCUTANEOUS at 06:56

## 2023-11-16 RX ADMIN — SUGAMMADEX 200 MG: 100 INJECTION, SOLUTION INTRAVENOUS at 11:49

## 2023-11-16 SDOH — HEALTH STABILITY: MENTAL HEALTH: CURRENT SMOKER: 0

## 2023-11-16 ASSESSMENT — PAIN SCALES - GENERAL
PAINLEVEL_OUTOF10: 2
PAINLEVEL_OUTOF10: 2
PAINLEVEL_OUTOF10: 0 - NO PAIN
PAINLEVEL_OUTOF10: 7
PAINLEVEL_OUTOF10: 4
PAINLEVEL_OUTOF10: 2
PAINLEVEL_OUTOF10: 0 - NO PAIN
PAINLEVEL_OUTOF10: 0 - NO PAIN
PAINLEVEL_OUTOF10: 4
PAIN_LEVEL: 0
PAINLEVEL_OUTOF10: 0 - NO PAIN
PAINLEVEL_OUTOF10: 7
PAINLEVEL_OUTOF10: 0 - NO PAIN

## 2023-11-16 ASSESSMENT — COLUMBIA-SUICIDE SEVERITY RATING SCALE - C-SSRS
6. HAVE YOU EVER DONE ANYTHING, STARTED TO DO ANYTHING, OR PREPARED TO DO ANYTHING TO END YOUR LIFE?: NO
2. HAVE YOU ACTUALLY HAD ANY THOUGHTS OF KILLING YOURSELF?: NO
1. IN THE PAST MONTH, HAVE YOU WISHED YOU WERE DEAD OR WISHED YOU COULD GO TO SLEEP AND NOT WAKE UP?: NO

## 2023-11-16 ASSESSMENT — PAIN - FUNCTIONAL ASSESSMENT
PAIN_FUNCTIONAL_ASSESSMENT: 0-10

## 2023-11-16 NOTE — OP NOTE
Robotic excision of endometriosis, CYSTOSCOPY Operative Note     Date: 2023  OR Location: [facility] OR    Name: Ariana De Santiago : 1975, Age: 48 y.o., MRN: [id number], Sex: female    Diagnosis  Pre-op Diagnosis     * Endometriosis, unspecified [N80.9] Post-op Diagnosis     * Endometriosis, unspecified [N80.9]     Procedures  Robotic excision of endometriosis, CYSTOSCOPY  59406 - SD LAPAROSCOPY W/LYSIS OF ADHESIONS  Oversewing of rectosigmoid colon    Surgeons      * Loren Lombardo - Primary    Resident/Fellow/Other Assistant:  Surgeon(s) and Role:     * Joana Bartholomew MD - Resident - Assisting    Procedure Summary  Anesthesia: General  ASA: II  Anesthesia Staff: Anesthesiologist: Iwona Mathews MD  Anesthesia Resident: Bradley Mcmahon DO  Estimated Blood Loss: 50mL  Intra-op Medications:   Medication Name Total Dose   surgical lubricant gel 1 Application   sodium chloride 0.9 % irrigation solution 1,000 mL   sterile water irrigation solution 1,000 mL              Anesthesia Record               Intraprocedure I/O Totals          Intake    Propofol Drip 0.00 mL    The total shown is the total volume documented since Anesthesia Start was filed.    lactated Ringer's infusion 1600.00 mL    Total Intake 1600 mL       Output    Urine 300 mL    Est. Blood Loss 50 mL    Total Output 350 mL       Net    Net Volume 1250 mL          Specimen:   ID Type Source Tests Collected by Time   1 : LEFT OVARIAN CYST Tissue SOFT TISSUE RESECTION SURGICAL PATHOLOGY EXAM Loren Lombardo MD 2023 0909   2 : RIGHT OVARIAN CYST WALL Tissue SOFT TISSUE RESECTION SURGICAL PATHOLOGY EXAM Loren Lombardo MD 2023 0919   3 : LEFT PELVIC SIDE WALL Tissue PELVIC SIDE WALL EXCISION SURGICAL PATHOLOGY EXAM Loren Lombardo MD 2023 0945   4 : SIGMOID EPIPLOICA Tissue SOFT TISSUE RESECTION SURGICAL PATHOLOGY EXAM Loren Lombardo MD 2023 0953   5 : RIGHT PELVIC SIDE WALL Tissue PELVIC SIDE WALL EXCISION SURGICAL  PATHOLOGY EXAM Loren Lombardo MD 11/16/2023 1011   6 : LEFT UTEROSACRAL Tissue SOFT TISSUE RESECTION SURGICAL PATHOLOGY EXAM Loren Lombardo MD 11/16/2023 1022   7 : POSTERIOR CUL DE SAC Tissue CUL DE SAC EXCISION SURGICAL PATHOLOGY EXAM Loren Lombardo MD 11/16/2023 1026   8 : BLADDER PERITONEUM Tissue SOFT TISSUE RESECTION SURGICAL PATHOLOGY EXAM Loren Lombardo MD 11/16/2023 1048   9 : BLADDER NODULE Tissue SOFT TISSUE RESECTION SURGICAL PATHOLOGY EXAM Loren Lombardo MD 11/16/2023 1053        Staff:   Circulator: Barber Mancuso RN  Relief Circulator: Marcella Avila RN; Julia Wu RN  Scrub Person: Loren Montano RN; Kairs Shanks         Drains and/or Catheters:   Urethral Catheter Non-latex 16 Fr. (Active)   Site Assessment Clean;Skin intact 11/16/23 1213   Collection Container Standard drainage bag 11/16/23 1213   Securement Method Securing device (Describe) 11/16/23 1213     Findings  Right Hemidiaphragm: normal, no evidence of endometriosis  Left Hemidiaphragm: normal, no evidence of endometriosis  Liver Edge: normal  Stomach Edge: normal     Large Intestines: normal, no evidence of endometriosis with exception of one subcentimenter gunpowder lesion on sigmoid epiploica   Small Intestines: normal, no evidence of endometriosis  Appendix: normal appearing, no evidence of endometriosis    Right Ovary: 2cm endometrioma, adherent to right pelvic sidewall  Right Fallopian Tube: surgically absent   Left Ovary: 4cm endometrioma, adherent to left pelvic sidewall, posterior cul-de-sac, and sigmoid epiploica   Left Fallopian Tube: surgically absent      Right Pelvic Sidewall: white scar   Right Uterosacral Ligament: no evidence of endometriosis  Left Pelvic Sidewall: white scar   Left Uterosacral Ligament: thickened nodularity   Posterior Cul De Sac: no evidence of endometriosis  Bladder Peritoneum: 2cm x 2cm thickened nodule adherent to detrusor muscle but not perforated through mucosa      Description  of Procedure  Patient was taken to the operating room where she was prepared and draped in the usual sterile fashion.  An EA sizer was placed.  A Esteban catheter was placed. Attention was then turned abdominally.  The base of the umbilicus was elevated using Kocher clamps.  The skin was incised with a scalpel.  The fascia was grasped with Kochers and entered sharply using a scalpel.  Peritoneum was bluntly opened using a Nidhi clamp.  Intraperitoneal placement was confirmed via visualization of underlying bowel.    A gel port with small wound retractor was placed at the umbilical incision. A robotic trocar and 8mm assistant port were placed into the gel port. Diagnostic laparoscopy was performed, and revealed findings as noted above.  No areas of trauma or injury were noted immediately inferior to the umbilicus.  The patient was placed in steep Trendelenburg positioning.  Pelvic findings were as noted above.       Ancillary trocars were then placed under direct visualization. Two robotic trocars were used. The small intestine was run from the ileocecal junction to the level of the duodenum.  No abnormalities were noted.  The robot was then docked.    Lysis of adhesions was then performed on bilateral ovaries until they were free from adhesions to pelvic sidewalls and posterior cul-de-sac. The left ovarian endometrioma was then incised, drained, and the cyst wall removed in entirety. A similar process was then repeated for the right ovarian endometrioma.      The ovaries were pexied to the anterior abdominal wall using an 0 V-loc.  The posterior cul-de-sac was then inspected.  The pelvic sidewall peritoneum on the left was elevated at the pelvic brim away from underlying structures away from the ureter and iliac vasculature.  It was incised using monopolar electrosurgery.  The incision was carried parallel to the infundibulopelvic ligament, inferior to the ovarian stroma, and parallel to the uterus.  The peritoneum  was then elevated away from the pelvic sidewall.  The ureter was identified and dissected.  The pelvic sidewall peritoneum was fully  from the retroperitoneal structures using a combination of monopolar electrosurgery and blunt dissection.  Full ureterolysis was completed to ensure that all peritoneum was being excised while a safe margin was being maintained from the ureter.  No areas of peritoneum were noted to remain on the left pelvic sidewall.     Attention was then turned to the right pelvic sidewall.  The peritoneum was excised in a manner identical to that completed on the left.  The posterior cul-de-sac and pelvic sidewalls were then thoroughly suction irrigated.  Excellent hemostasis was noted and no areas of trauma were noted.     Attention was then turned to the posterior cul-de-sac.  The posterior cul-de-sac peritoneum was fully excised using a combination of monopolar electrosurgery and blunt dissection.  Care was taken to avoid injury to the underlying rectum and bowel. A small area of anterior rectum was noted to be deserosalized, and this was oversewn.  The pexied ovaries were released.  Excellent hemostasis was noted.       Attention was then turned anteriorly.  The bladder peritoneum was incised using monopolar electrosurgery.  A peritoneal incision was carried from the left round ligament to the pubic symphysis and ended at the right round ligament.  The peritoneum was then stripped away from the underlying bladder using a combination of monopolar electrosurgery and blunt dissection.  Full excision of bladder peritoneum was noted at the conclusion of this dissection. The thickened nodule was then grasped and careful progressive dissection with monopolar cautery was performed to remove bladder nodule in its entirety. Following removal, no entry into the bladder was noted but the underlying detrusor muscle was noted to be deserosalized. Cystoscopy was performed, confirming findings, and  there was no evidence of leakage from bladder. Cystoscopy also showed no concerning findings within bladder mucosa. The bladder serosa was then reapproximated, and a layer of running imbricating suture was placed over this site.      The pelvis was then thoroughly suction irrigated. Floseal was placed into the pelvis. Excellent hemostasis was noted.  All instruments were then removed from the abdomen and pelvis.  The robot was undocked.     The umbilical fascia was then closed with 0 Vicryl suture.  The skin was closed with 4-0 Monocryl.  The EA sizer was then removed. The Esteban catheter was removed. The patient was awakened from general anesthesia and taken the recovery room in stable condition.      I was present and scrubbed for the entirety of the surgical procedure.    This was procedure was substantially more complicated and required increase time and surgical expertise due to prior surgeries and presence of endometriosis distorting normal tissue planes. .       Complications:  None; patient tolerated the procedure well.    Disposition: PACU - hemodynamically stable.  Condition: stable       Loren Lombardo  Phone Number: [phone]

## 2023-11-16 NOTE — ANESTHESIA PROCEDURE NOTES
Airway  Date/Time: 11/16/2023 8:04 AM  Urgency: elective    Airway not difficult    Staffing  Performed: resident   Authorized by: Iwona Mathews MD    Performed by: Bradley Mcmahon DO  Patient location during procedure: OR    Indications and Patient Condition  Indications for airway management: anesthesia  Spontaneous Ventilation: absent  Sedation level: deep  Preoxygenated: yes  Patient position: sniffing  Mask difficulty assessment: 1 - vent by mask  Planned trial extubation    Final Airway Details  Final airway type: endotracheal airway      Successful airway: ETT  Cuffed: yes   Successful intubation technique: direct laryngoscopy  Facilitating devices/methods: intubating stylet and cricoid pressure  Blade: Vivienne  Blade size: #3  Cormack-Lehane Classification: grade I - full view of glottis (Initial view grade III, improved to grade 1 with light cricoid pressure)  Cuff volume (mL): 8  Measured from: teeth  ETT to teeth (cm): 21  Number of attempts at approach: 1

## 2023-11-16 NOTE — ANESTHESIA POSTPROCEDURE EVALUATION
Patient: Ariana De Santiago    Procedure Summary       Date: 11/16/23 Room / Location: [facility] OR 02 / Virtual [facility] OR    Anesthesia Start: 0729 Anesthesia Stop: 1205    Procedure: Robotic excision of endometriosis, CYSTOSCOPY Diagnosis:       Endometriosis, unspecified      (Endometriosis, unspecified [N80.9])    Surgeons: Loren Lombardo MD Responsible Provider: Iwona Mathews MD    Anesthesia Type: general ASA Status: 2            Anesthesia Type: general    Vitals Value Taken Time   /86 11/16/23 1210   Temp 36.6 11/16/23 1210   Pulse 81 11/16/23 1210   Resp 15 11/16/23 1210   SpO2 100 11/16/23 1210       Anesthesia Post Evaluation    Patient location during evaluation: PACU  Patient participation: complete - patient participated  Level of consciousness: awake and alert  Pain score: 0  Pain management: adequate  Airway patency: patent  Cardiovascular status: acceptable  Respiratory status: acceptable  Hydration status: acceptable  Postoperative Nausea and Vomiting: none        No notable events documented.

## 2023-11-16 NOTE — ANESTHESIA PREPROCEDURE EVALUATION
Patient: Ariana De Santiago    Procedure Information       Date/Time: 11/16/23 0715    Procedure: Robotic excision of endometriosis - linda/nc 7/17    Location: [facility] OR  / [facility] OR    Surgeons: Loren Lombardo MD            Relevant Problems   Anesthesia   (+) History of general anesthesia   No history of complications PONV (postoperative nausea and vomiting)      Cardiovascular   No history of complications Chest pain   No history of complications HTN (hypertension)   No history of complications MI (myocardial infarction) (CMS/HCC)      Endocrine   (+) Obesity   No history of complications Diabetes mellitus, type 2 (CMS/HCC)      GI   No history of complications Gastroesophageal reflux disease      /Renal (within normal limits)  Endometriosis with hx of TLH-BS w/ suspected endometriosis recurrence      Neuro/Psych  Migrains   No history of complications CVA (cerebral vascular accident) (CMS/HCC)   No history of complications Seizures (CMS/HCC)      Pulmonary (within normal limits)      GI/Hepatic (within normal limits)      Musculoskeletal   No history of complications Chronic low back pain   No history of complications Chronic neck pain       Clinical information reviewed:   Tobacco  Allergies  Meds   Med Hx  Surg Hx  OB Status  Fam Hx  Soc   Hx        NPO Detail:  NPO/Void Status  Date of Last Liquid: 11/15/23  Time of Last Liquid: 2100  Date of Last Solid: 11/15/23  Time of Last Solid: 2100         Vitals:    11/16/23 0621   BP: 125/77   Pulse: 82   Resp: 16   Temp: 36 °C (96.8 °F)   SpO2: 95%       Chemistry    Lab Results   Component Value Date/Time     11/03/2023 1016    K 4.3 11/03/2023 1016     11/03/2023 1016    CO2 27 11/03/2023 1016    BUN 10 11/03/2023 1016    CREATININE 0.58 11/03/2023 1016    Lab Results   Component Value Date/Time    CALCIUM 10.0 11/03/2023 1016    ALKPHOS 71 12/15/2022 1252    AST 15 12/15/2022 1252    ALT 14 12/15/2022 1252    BILITOT 0.4  12/15/2022 1252          Lab Results   Component Value Date/Time    WBC 5.9 11/03/2023 1016    HGB 14.2 11/03/2023 1016    HCT 43.9 11/03/2023 1016     11/03/2023 1016     Lab Results   Component Value Date/Time    PROTIME 11.4 01/02/2018 1034    INR 1.0 01/02/2018 1034     Encounter Date: 07/31/23   ECG 12 lead    Narrative    Completed.       Physical Exam    Airway  Mallampati: I  TM distance: >3 FB  Neck ROM: full     Cardiovascular   Rhythm: regular     Dental    Pulmonary   Breath sounds clear to auscultation     Abdominal            Anesthesia Plan    ASA 2     general     The patient is not a current smoker.    Anesthetic plan and risks discussed with patient and spouse.  Use of blood products discussed with patient and spouse who consented to blood products.    Plan discussed with attending and resident.

## 2023-11-20 ENCOUNTER — TELEPHONE (OUTPATIENT)
Dept: OBSTETRICS AND GYNECOLOGY | Facility: CLINIC | Age: 48
End: 2023-11-20
Payer: COMMERCIAL

## 2023-11-20 ENCOUNTER — TELEPHONE (OUTPATIENT)
Dept: RADIOLOGY | Facility: CLINIC | Age: 48
End: 2023-11-20

## 2023-11-20 VITALS
HEIGHT: 67 IN | TEMPERATURE: 98.8 F | WEIGHT: 238 LBS | DIASTOLIC BLOOD PRESSURE: 110 MMHG | OXYGEN SATURATION: 96 % | RESPIRATION RATE: 16 BRPM | BODY MASS INDEX: 37.35 KG/M2 | SYSTOLIC BLOOD PRESSURE: 155 MMHG | HEART RATE: 97 BPM

## 2023-11-20 PROCEDURE — 99283 EMERGENCY DEPT VISIT LOW MDM: CPT | Performed by: EMERGENCY MEDICINE

## 2023-11-20 PROCEDURE — 99285 EMERGENCY DEPT VISIT HI MDM: CPT | Performed by: EMERGENCY MEDICINE

## 2023-11-20 ASSESSMENT — PAIN SCALES - GENERAL: PAINLEVEL_OUTOF10: 2

## 2023-11-20 ASSESSMENT — PAIN DESCRIPTION - LOCATION: LOCATION: ABDOMEN

## 2023-11-20 ASSESSMENT — PAIN - FUNCTIONAL ASSESSMENT: PAIN_FUNCTIONAL_ASSESSMENT: 0-10

## 2023-11-20 NOTE — TELEPHONE ENCOUNTER
Patient wants to know when her cathetor is coming out.  Patient says her  says it would come out in 5 days.  Probably would be removed tomorrow when Dr. Lombardo is in the office but I will check.

## 2023-11-21 ENCOUNTER — TELEPHONE (OUTPATIENT)
Dept: OBSTETRICS AND GYNECOLOGY | Facility: CLINIC | Age: 48
End: 2023-11-21
Payer: COMMERCIAL

## 2023-11-21 ENCOUNTER — CLINICAL SUPPORT (OUTPATIENT)
Dept: OBSTETRICS AND GYNECOLOGY | Facility: CLINIC | Age: 48
End: 2023-11-21
Payer: COMMERCIAL

## 2023-11-21 ENCOUNTER — HOSPITAL ENCOUNTER (EMERGENCY)
Facility: HOSPITAL | Age: 48
Discharge: HOME | End: 2023-11-21
Attending: EMERGENCY MEDICINE
Payer: COMMERCIAL

## 2023-11-21 DIAGNOSIS — T83.511A URINARY TRACT INFECTION ASSOCIATED WITH CATHETERIZATION OF URINARY TRACT, UNSPECIFIED INDWELLING URINARY CATHETER TYPE, INITIAL ENCOUNTER (CMS-HCC): Primary | ICD-10-CM

## 2023-11-21 DIAGNOSIS — N39.0 URINARY TRACT INFECTION ASSOCIATED WITH CATHETERIZATION OF URINARY TRACT, UNSPECIFIED INDWELLING URINARY CATHETER TYPE, INITIAL ENCOUNTER (CMS-HCC): Primary | ICD-10-CM

## 2023-11-21 LAB
ALBUMIN SERPL BCP-MCNC: 4.4 G/DL (ref 3.4–5)
ALP SERPL-CCNC: 67 U/L (ref 33–110)
ALT SERPL W P-5'-P-CCNC: 21 U/L (ref 7–45)
ANION GAP SERPL CALC-SCNC: 14 MMOL/L (ref 10–20)
APPEARANCE UR: ABNORMAL
AST SERPL W P-5'-P-CCNC: 18 U/L (ref 9–39)
BACTERIA #/AREA URNS AUTO: ABNORMAL /HPF
BASOPHILS # BLD AUTO: 0.09 X10*3/UL (ref 0–0.1)
BASOPHILS NFR BLD AUTO: 0.9 %
BILIRUB SERPL-MCNC: 0.5 MG/DL (ref 0–1.2)
BILIRUB UR STRIP.AUTO-MCNC: NEGATIVE MG/DL
BUN SERPL-MCNC: 13 MG/DL (ref 6–23)
CALCIUM SERPL-MCNC: 9.7 MG/DL (ref 8.6–10.3)
CAOX CRY #/AREA UR COMP ASSIST: ABNORMAL /HPF
CHLORIDE SERPL-SCNC: 102 MMOL/L (ref 98–107)
CO2 SERPL-SCNC: 27 MMOL/L (ref 21–32)
COLOR UR: YELLOW
CREAT SERPL-MCNC: 0.7 MG/DL (ref 0.5–1.05)
EOSINOPHIL # BLD AUTO: 0.32 X10*3/UL (ref 0–0.7)
EOSINOPHIL NFR BLD AUTO: 3.3 %
ERYTHROCYTE [DISTWIDTH] IN BLOOD BY AUTOMATED COUNT: 12.1 % (ref 11.5–14.5)
GFR SERPL CREATININE-BSD FRML MDRD: >90 ML/MIN/1.73M*2
GLUCOSE SERPL-MCNC: 105 MG/DL (ref 74–99)
GLUCOSE UR STRIP.AUTO-MCNC: NEGATIVE MG/DL
HCT VFR BLD AUTO: 42.1 % (ref 36–46)
HGB BLD-MCNC: 14.1 G/DL (ref 12–16)
IMM GRANULOCYTES # BLD AUTO: 0.05 X10*3/UL (ref 0–0.7)
IMM GRANULOCYTES NFR BLD AUTO: 0.5 % (ref 0–0.9)
KETONES UR STRIP.AUTO-MCNC: NEGATIVE MG/DL
LACTATE SERPL-SCNC: 1.5 MMOL/L (ref 0.4–2)
LEUKOCYTE ESTERASE UR QL STRIP.AUTO: ABNORMAL
LYMPHOCYTES # BLD AUTO: 2.55 X10*3/UL (ref 1.2–4.8)
LYMPHOCYTES NFR BLD AUTO: 26.5 %
MCH RBC QN AUTO: 29.9 PG (ref 26–34)
MCHC RBC AUTO-ENTMCNC: 33.5 G/DL (ref 32–36)
MCV RBC AUTO: 89 FL (ref 80–100)
MONOCYTES # BLD AUTO: 0.64 X10*3/UL (ref 0.1–1)
MONOCYTES NFR BLD AUTO: 6.6 %
MUCOUS THREADS #/AREA URNS AUTO: ABNORMAL /LPF
NEUTROPHILS # BLD AUTO: 5.98 X10*3/UL (ref 1.2–7.7)
NEUTROPHILS NFR BLD AUTO: 62.2 %
NITRITE UR QL STRIP.AUTO: NEGATIVE
NRBC BLD-RTO: 0 /100 WBCS (ref 0–0)
PH UR STRIP.AUTO: 5 [PH]
PLATELET # BLD AUTO: 246 X10*3/UL (ref 150–450)
POTASSIUM SERPL-SCNC: 3.8 MMOL/L (ref 3.5–5.3)
PROT SERPL-MCNC: 7.8 G/DL (ref 6.4–8.2)
PROT UR STRIP.AUTO-MCNC: ABNORMAL MG/DL
RBC # BLD AUTO: 4.72 X10*6/UL (ref 4–5.2)
RBC # UR STRIP.AUTO: ABNORMAL /UL
RBC #/AREA URNS AUTO: >20 /HPF
SODIUM SERPL-SCNC: 139 MMOL/L (ref 136–145)
SP GR UR STRIP.AUTO: 1.02
UROBILINOGEN UR STRIP.AUTO-MCNC: <2 MG/DL
WBC # BLD AUTO: 9.6 X10*3/UL (ref 4.4–11.3)
WBC #/AREA URNS AUTO: ABNORMAL /HPF
YEAST BUDDING #/AREA UR COMP ASSIST: PRESENT /HPF

## 2023-11-21 PROCEDURE — 87086 URINE CULTURE/COLONY COUNT: CPT | Mod: AHULAB | Performed by: PHYSICIAN ASSISTANT

## 2023-11-21 PROCEDURE — 85025 COMPLETE CBC W/AUTO DIFF WBC: CPT | Performed by: EMERGENCY MEDICINE

## 2023-11-21 PROCEDURE — 83605 ASSAY OF LACTIC ACID: CPT | Performed by: EMERGENCY MEDICINE

## 2023-11-21 PROCEDURE — 81001 URINALYSIS AUTO W/SCOPE: CPT | Performed by: PHYSICIAN ASSISTANT

## 2023-11-21 PROCEDURE — 84075 ASSAY ALKALINE PHOSPHATASE: CPT | Performed by: EMERGENCY MEDICINE

## 2023-11-21 PROCEDURE — 36415 COLL VENOUS BLD VENIPUNCTURE: CPT | Performed by: EMERGENCY MEDICINE

## 2023-11-21 RX ORDER — CEPHALEXIN 500 MG/1
500 CAPSULE ORAL 2 TIMES DAILY
Qty: 10 CAPSULE | Refills: 0 | Status: SHIPPED | OUTPATIENT
Start: 2023-11-21 | End: 2023-11-26

## 2023-11-21 NOTE — ED PROVIDER NOTES
HPI   Chief Complaint   Patient presents with    Blood in Urine     Patient arrived from home via private auto ambulatory upon arrival complaining of hematuria beginning today.  The patient had cyst removal from her bladder and ovaries on Thursday the 16th and has had clear urine output since, but today noticed blood in her urine.  The patient describes no new pain since the surgery, but was advised by her doctor to visit the emergency room.       HPI  Patient is a 48-year-old female with a past medical history significant for endometriosis status post hysterectomy in 2018 who presented to the emergency room with chief complaint of hematuria and clots. She had a robotic excision of endometriosis with cystoscopy with Grupo on thursday at [facility]. states they also removed a cyst from her bladder.  She had a Esteban catheter placed at that time which she still has in place.  She did not have any bleeding until today when she noted some hematuria with clots.  These have already cleared without any intervention.  She states that she feels very well and denies any abdominal or pelvic pain.  Denies any vaginal discharge or bleeding.  She denies any fevers, chills, nausea, vomiting.  She states that her surgical incisions are healing very well.  She called the physician on-call, Dr. Cullen who told her to come to the emergency room to be evaluated for hematuria.  She is not on prophylactic antibiotics.      PMHx: As above  PSHx: As above  FamilyHx: Denies pertinent  SocialHx: Denies  Allergies: NKDA  Medications: See Medication Reconciliation     ROS  As above but otherwise denies      Physical Exam    GENERAL: Awake and Alert, No Acute Distress  HEENT: AT/NC, PERRL, EOMI, Normal Oropharynx, No Signs of Dehydration  NECK: Normal Inspection, No JVD  CARDIOVASCULAR: RRR, No M/R/G  RESPIRATORY: CTA Bilaterally, No Wheezes, Rales or Rhonchi, Chest Wall Non-tender  ABDOMEN: Multiple small surgical incisions that are very well  appearing, healing and without any signs of erythema, purulence or tenderness to palpation.  Soft, non-tender abdomen, Normal Bowel Sounds, No Distention  BACK: No CVA Tenderness  SKIN: Normal Color, Warm, Dry, No Rashes   EXTREMITIES: Non-Tender, Full ROM, No Pedal Edema  NEURO: A&O x 3, Normal Motor and Sensation, Normal Mood and Affect    Nursing Assessment and Vitals Reviewed    Medical Decision  Patient is a 48-year-old female with a past medical history significant for endometriosis status post hysterectomy in 2018 who presented to the emergency room with chief complaint of hematuria and clots. She had a robotic excision of endometriosis with cystoscopy with Grupo on thursday at [facility]. states they also removed a cyst from her bladder.  She had a Esteban catheter placed at that time which she still has in place.  She did not have any bleeding until today when she noted some hematuria with clots.  These have already cleared without any intervention.  She states that she feels very well and denies any abdominal or pelvic pain.  Denies any vaginal discharge or bleeding.  She denies any fevers, chills, nausea, vomiting.  She states that her surgical incisions are healing very well.  She called the physician on-call, Dr. Cullen who told her to come to the emergency room to be evaluated for hematuria.  She is not on prophylactic antibiotics.  On arrival she is very well-appearing and in no acute distress.  Lungs are clear and heart is regular rate and rhythm.  Abdomen is soft, nontender and distended.  She has well-appearing surgical incisions that are not concerning for infection.  She has a Esteban catheter in place that looks to have some slight discoloration and urine in the Esteban bag, however, the urine now coming from the tube is completely clear.  No signs of clots.  Urinalysis is ordered as well as basic labs.  Dr. Cullen has been texted via haiku and connected with Dr. Martínez who is assuming care for  patient and will await results of workup to determine final disposition.                              No data recorded                Patient History   Past Medical History:   Diagnosis Date    Endometriosis 09/21/2023    Ob/Gyn: Loren Lombardo, endometriosis s/p TL-BS presents to discuss surgical planning.    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     Past Surgical History:   Procedure Laterality Date    HYSTERECTOMY      OTHER SURGICAL HISTORY  01/29/2019    Hysterectomy    OTHER SURGICAL HISTORY  11/26/2014    Ostectomy Calcaneus For Spur    OTHER SURGICAL HISTORY  11/26/2014    Gynecologic Laparoscopy With Adhesiolysis     Family History   Problem Relation Name Age of Onset    Osteoporosis Mother      Diabetes Father      Heart disease Father       Social History     Tobacco Use    Smoking status: Never    Smokeless tobacco: Never   Vaping Use    Vaping Use: Never used   Substance Use Topics    Alcohol use: Never    Drug use: Never       Physical Exam   ED Triage Vitals [11/20/23 2101]   Temp Heart Rate Resp BP   37.1 °C (98.8 °F) 97 16 (!) 155/110      SpO2 Temp Source Heart Rate Source Patient Position   96 % Oral Monitor Sitting      BP Location FiO2 (%)     Left arm --       Physical Exam    ED Course & [facility]   ED Course as of 11/21/23 1850   Tue Nov 21, 2023   0403 Labs notable for normal CMP with normal renal function, normal CBC, UA with trace leukocyte esterase, 11-20 WBCs, 1+ bacteria concerning for possible UTI.  Patient is having her catheter removed earlier today and prefers that we wait until then to exchange  it.  She is stable for discharge.  She was given a prescription for Keflex for her UTI. [CG]      ED Course User Index  [CG] Gabby Martínez MD         Diagnoses as of 11/21/23 1850   Urinary tract infection associated with catheterization of urinary tract, unspecified indwelling urinary catheter type, initial encounter (CMS/[facility])       Medical Decision Making      Procedure  Procedures     Shannon Horowitz MD  11/21/23 1850

## 2023-11-21 NOTE — DISCHARGE INSTRUCTIONS
You were seen today for blood in your urine.  You may have a UTI.  We gave you a course of antibiotics for this.  You should have your Esteban removed at your appointment today.  Your evaluation was not concerning for an emergency at this time. Please see the attached information sheet for information about your condition, how to care for your condition at home, and reasons to return to the emergency department. Take any prescriptions written today as prescribed. You should call your primary care provider within 24 hours to tell them about today's visit, including any new medications or medication changes, as he or she may want to see you in the office for further evaluation. If you do not have a primary care provider, call  [phone] for an appointment. We offer in-person office visits as well as virtual options. Please do not hesitate to call  [phone] or return to the emergency department with any new or unresolved concerns or symptoms. Thank you for choosing [facility] for your care.

## 2023-11-21 NOTE — PROGRESS NOTES
For full history, physical exam, and prior hospital course, please see previous ED provider note. This is in addition to the primary record.    Patient received in sign out from prior provider team pending labs, urinalysis, and reevaluation.    Labs Reviewed   URINALYSIS WITH REFLEX MICROSCOPIC AND CULTURE - Abnormal       Result Value    Color, Urine Yellow      Appearance, Urine Hazy (*)     Specific Gravity, Urine 1.021      pH, Urine 5.0      Protein, Urine 30 (1+) (*)     Glucose, Urine NEGATIVE      Blood, Urine LARGE (3+) (*)     Ketones, Urine NEGATIVE      Bilirubin, Urine NEGATIVE      Urobilinogen, Urine <2.0      Nitrite, Urine NEGATIVE      Leukocyte Esterase, Urine TRACE (*)    COMPREHENSIVE METABOLIC PANEL - Abnormal    Glucose 105 (*)     Sodium 139      Potassium 3.8      Chloride 102      Bicarbonate 27      Anion Gap 14      Urea Nitrogen 13      Creatinine 0.70      eGFR >90      Calcium 9.7      Albumin 4.4      Alkaline Phosphatase 67      Total Protein 7.8      AST 18      Bilirubin, Total 0.5      ALT 21     MICROSCOPIC ONLY, URINE - Abnormal    WBC, Urine 11-20 (*)     RBC, Urine >20 (*)     Bacteria, Urine 1+ (*)     Budding Yeast, Urine PRESENT (*)     Mucus, Urine 1+      Calcium Oxalate Crystals, Urine 1+     LACTATE - Normal    Lactate 1.5      Narrative:     Venipuncture immediately after or during the administration of Metamizole may lead to falsely low results. Testing should be performed immediately  prior to Metamizole dosing.   URINE CULTURE   URINALYSIS WITH REFLEX MICROSCOPIC AND CULTURE    Narrative:     The following orders were created for panel order Urinalysis with Reflex Microscopic and Culture.  Procedure                               Abnormality         Status                     ---------                               -----------         ------                     Urinalysis with Reflex M...[[id number]]  Abnormal            Final result               Extra Urine Vanegas  Tube[[id number]]                                                         Please view results for these tests on the individual orders.   CBC WITH AUTO DIFFERENTIAL    WBC 9.6      nRBC 0.0      RBC 4.72      Hemoglobin 14.1      Hematocrit 42.1      MCV 89      MCH 29.9      MCHC 33.5      RDW 12.1      Platelets 246      Neutrophils % 62.2      Immature Granulocytes %, Automated 0.5      Lymphocytes % 26.5      Monocytes % 6.6      Eosinophils % 3.3      Basophils % 0.9      Neutrophils Absolute 5.98      Immature Granulocytes Absolute, Automated 0.05      Lymphocytes Absolute 2.55      Monocytes Absolute 0.64      Eosinophils Absolute 0.32      Basophils Absolute 0.09     HCG, URINE, QUALITATIVE      No orders to display      Medications - No data to display     ED Course as of 11/21/23 0404   Tue Nov 21, 2023   0403 Labs notable for normal CMP with normal renal function, normal CBC, UA with trace leukocyte esterase, 11-20 WBCs, 1+ bacteria concerning for possible UTI.  Patient is having her catheter removed earlier today and prefers that we wait until then to exchange it.  She is stable for discharge.  She was given a prescription for Keflex for her UTI. [CG]      ED Course User Index  [CG] Gabby Martínez MD         Diagnoses as of 11/21/23 0404   Urinary tract infection associated with catheterization of urinary tract, unspecified indwelling urinary catheter type, initial encounter (CMS/[facility])      DISCHARGE.  The patient was discharged with both verbal and written anticipatory guidance and strict return precautions. Discharge diagnosis, instructions and plan were discussed and understood. I emphasized the importance of following up with their primary care provider within 24-48 hours and with any referrals in the timeframe recommended. At the time of discharge the patient was comfortable and was in no apparent distress. Patient is aware of diagnostic uncertainty and was notified though testing is negative  here, there is a very small chance that pathology may be missed.  The patient understands these risks and the patient/family understood to call 911 or return immediately to the emergency department if the symptoms worsen or if they have any additional concerns.      FOLLOW UP  Primary care provider in 1-2 days.      Gabby Martínez MD  EM/IM/Peds    This note was dictated by speech recognition. Minor errors in transcription may be present.

## 2023-11-21 NOTE — TELEPHONE ENCOUNTER
Patient is home from ED, had blood and pain on urination.  They left catheter in and gave her antibiotics.  Patient will come in this afternoon and have catheter removed and make sure she can void per Dr. Lombardo.

## 2023-11-22 LAB — BACTERIA UR CULT: NO GROWTH

## 2023-11-29 LAB
LABORATORY COMMENT REPORT: NORMAL
PATH REPORT.FINAL DX SPEC: NORMAL
PATH REPORT.GROSS SPEC: NORMAL
PATH REPORT.RELEVANT HX SPEC: NORMAL
PATH REPORT.TOTAL CANCER: NORMAL

## 2023-11-30 ENCOUNTER — OFFICE VISIT (OUTPATIENT)
Dept: OBSTETRICS AND GYNECOLOGY | Facility: CLINIC | Age: 48
End: 2023-11-30
Payer: COMMERCIAL

## 2023-11-30 VITALS
DIASTOLIC BLOOD PRESSURE: 73 MMHG | WEIGHT: 233.4 LBS | SYSTOLIC BLOOD PRESSURE: 108 MMHG | HEIGHT: 67 IN | BODY MASS INDEX: 36.63 KG/M2 | HEART RATE: 90 BPM

## 2023-11-30 DIAGNOSIS — Z48.89 POSTOPERATIVE VISIT: Primary | ICD-10-CM

## 2023-11-30 PROCEDURE — 99024 POSTOP FOLLOW-UP VISIT: CPT | Performed by: STUDENT IN AN ORGANIZED HEALTH CARE EDUCATION/TRAINING PROGRAM

## 2023-11-30 PROCEDURE — 1036F TOBACCO NON-USER: CPT | Performed by: STUDENT IN AN ORGANIZED HEALTH CARE EDUCATION/TRAINING PROGRAM

## 2023-11-30 ASSESSMENT — PAIN SCALES - GENERAL: PAINLEVEL: 4

## 2023-11-30 NOTE — PROGRESS NOTES
"Division of Minimally Invasive Gynecologic Surgery  [facility]    11/30/23 Gynecology Visit    Ariana De Santiago is a 48 y.o. status post robotic excision of endometriosis notable for removal of large bladder nodule w/o disruption of bladder mucosa.     Overall recovering well, meeting all milestones.     PMHx, PSHx, SHx, Allergies, and Medications updated in Epic.    ROS: reviewed and negative    PE:/73   Pulse 90   Ht 1.702 m (5' 7\")   Wt 106 kg (233 lb 6.4 oz)   BMI 36.56 kg/m²    Constitutional:  No acute distress  HEENT: EOM grossly intact, MMM, neck supple and with full ROM  Pulm:  Effort normal. No accessory muscle usage.  No respiratory distress.  Neurological:  AAO x 3, appropriate to interview  Skin: Warm, no pallor.  Psychiatric:  normal mood and affect.    Assessment/Plan:     Ariana De Santiago is a 48 y.o. status post robotic excision of endometriosis notable for removal of large bladder nodule w/o disruption of bladder mucosa.   - Recovering well, no concerns  - Path reviewed, benign, positive for endometriosis  - Continue post op restrictions until 4 weeks after surgery, reviewed these today      Loren Lombardo MD  Division of Minimally Invasive Gynecologic Surgery  [facility]    "

## 2023-12-11 ENCOUNTER — TELEPHONE (OUTPATIENT)
Dept: PREOP | Facility: HOSPITAL | Age: 48
End: 2023-12-11
Payer: COMMERCIAL

## 2023-12-11 DIAGNOSIS — L76.82 POSTOPERATIVE SURGICAL COMPLICATION INVOLVING SUBCUTANEOUS TISSUE ASSOCIATED WITH DERMATOLOGIC PROCEDURE, UNSPECIFIED COMPLICATION: Primary | ICD-10-CM

## 2023-12-11 RX ORDER — SULFAMETHOXAZOLE AND TRIMETHOPRIM 800; 160 MG/1; MG/1
1 TABLET ORAL 2 TIMES DAILY
Qty: 14 TABLET | Refills: 0 | Status: SHIPPED | OUTPATIENT
Start: 2023-12-11 | End: 2023-12-18

## 2023-12-12 NOTE — TELEPHONE ENCOUNTER
Patient messaged reporting redness at incision site w/ some low volume purulent drainage, along w/ increased pain at umbilical incision. Recommend starting course of PO Bactrim and daily rinse w/ dilute hydrogen peroxide. If worsening or no improvement in 2-3 days, will schedule clinic follow up.

## 2024-02-14 ENCOUNTER — OFFICE VISIT (OUTPATIENT)
Dept: OPHTHALMOLOGY | Facility: CLINIC | Age: 49
End: 2024-02-14
Payer: COMMERCIAL

## 2024-02-14 DIAGNOSIS — H52.13 MYOPIA, BILATERAL: Primary | ICD-10-CM

## 2024-02-14 DIAGNOSIS — H52.4 PRESBYOPIA: ICD-10-CM

## 2024-02-14 DIAGNOSIS — H52.222 REGULAR ASTIGMATISM OF LEFT EYE: ICD-10-CM

## 2024-02-14 DIAGNOSIS — H35.412 LATTICE DEGENERATION OF LEFT RETINA: ICD-10-CM

## 2024-02-14 PROCEDURE — FLVLF CONTACT LENS EVALUATION (SP): Performed by: OPTOMETRIST

## 2024-02-14 PROCEDURE — 92014 COMPRE OPH EXAM EST PT 1/>: CPT | Performed by: OPTOMETRIST

## 2024-02-14 ASSESSMENT — REFRACTION_MANIFEST
OS_ADD: +1.50
OD_SPHERE: -3.00
OS_SPHERE: -1.25
OD_ADD: +1.50
OS_CYLINDER: -0.50

## 2024-02-14 ASSESSMENT — REFRACTION_WEARINGRX
OS_CYLINDER: -0.75
OS_SPHERE: -1.75
OD_CYLINDER: SPHER
OS_AXIS: 167
OD_SPHERE: -3.00

## 2024-02-14 ASSESSMENT — VISUAL ACUITY
CORRECTION_TYPE: CONTACTS
OS_CC: 20/25+2
METHOD: SNELLEN - LINEAR
OD_CC: 20-20-2

## 2024-02-14 ASSESSMENT — REFRACTION_CURRENTRX
OD_SPHERE: -2.75
OS_DIAMETER: 14.2
OS_BASECURVE: 8.6
OD_DIAMETER: 14.2
OS_BRAND: BIOMEDICS 55 PREMIER UV
OD_BRAND: BIOMEDICS 55 PREMIER UV
OS_SPHERE: -1.75
OD_BASECURVE: 8.6

## 2024-02-14 ASSESSMENT — ENCOUNTER SYMPTOMS
HEMATOLOGIC/LYMPHATIC NEGATIVE: 0
PSYCHIATRIC NEGATIVE: 0
MUSCULOSKELETAL NEGATIVE: 0
ALLERGIC/IMMUNOLOGIC NEGATIVE: 0
CONSTITUTIONAL NEGATIVE: 0
ENDOCRINE NEGATIVE: 0
CARDIOVASCULAR NEGATIVE: 0
NEUROLOGICAL NEGATIVE: 0
GASTROINTESTINAL NEGATIVE: 0
EYES NEGATIVE: 0
RESPIRATORY NEGATIVE: 0

## 2024-02-14 ASSESSMENT — CONF VISUAL FIELD
OD_SUPERIOR_TEMPORAL_RESTRICTION: 0
OD_INFERIOR_TEMPORAL_RESTRICTION: 0
OS_NORMAL: 1
OS_SUPERIOR_NASAL_RESTRICTION: 0
OD_NORMAL: 1
OD_SUPERIOR_NASAL_RESTRICTION: 0
OS_INFERIOR_NASAL_RESTRICTION: 0
OS_INFERIOR_TEMPORAL_RESTRICTION: 0
OD_INFERIOR_NASAL_RESTRICTION: 0
OS_SUPERIOR_TEMPORAL_RESTRICTION: 0
METHOD: COUNTING FINGERS

## 2024-02-14 ASSESSMENT — CUP TO DISC RATIO
OS_RATIO: .4
OD_RATIO: .4

## 2024-02-14 ASSESSMENT — EXTERNAL EXAM - LEFT EYE: OS_EXAM: NORMAL

## 2024-02-14 ASSESSMENT — SLIT LAMP EXAM - LIDS
COMMENTS: GOOD POSITION
COMMENTS: GOOD POSITION

## 2024-02-14 ASSESSMENT — EXTERNAL EXAM - RIGHT EYE: OD_EXAM: NORMAL

## 2024-02-14 NOTE — PROGRESS NOTES
A spectacle prescription was given at the patient`s request. A contact lens prescription was dispensed at the patient`s request.     Lattice degeneration OS. The patient was asked to return to our clinic or seek out eye care ASAP if new flashes of light or floaters are noted. Minor changes.     The patient was asked to return to our clinic in one year or sooner if ocular or vision changes occur

## 2024-03-25 ENCOUNTER — OFFICE VISIT (OUTPATIENT)
Dept: PRIMARY CARE | Facility: CLINIC | Age: 49
End: 2024-03-25
Payer: COMMERCIAL

## 2024-03-25 ENCOUNTER — LAB (OUTPATIENT)
Dept: LAB | Facility: LAB | Age: 49
End: 2024-03-25
Payer: COMMERCIAL

## 2024-03-25 VITALS — DIASTOLIC BLOOD PRESSURE: 73 MMHG | SYSTOLIC BLOOD PRESSURE: 121 MMHG

## 2024-03-25 DIAGNOSIS — E66.9 OBESITY, UNSPECIFIED CLASSIFICATION, UNSPECIFIED OBESITY TYPE, UNSPECIFIED WHETHER SERIOUS COMORBIDITY PRESENT: Primary | ICD-10-CM

## 2024-03-25 DIAGNOSIS — E78.2 MIXED HYPERLIPIDEMIA: ICD-10-CM

## 2024-03-25 DIAGNOSIS — E66.9 OBESITY, UNSPECIFIED CLASSIFICATION, UNSPECIFIED OBESITY TYPE, UNSPECIFIED WHETHER SERIOUS COMORBIDITY PRESENT: ICD-10-CM

## 2024-03-25 DIAGNOSIS — R73.02 GLUCOSE INTOLERANCE (IMPAIRED GLUCOSE TOLERANCE): ICD-10-CM

## 2024-03-25 LAB
T4 FREE SERPL-MCNC: 0.87 NG/DL (ref 0.78–1.48)
THYROPEROXIDASE AB SERPL-ACNC: 29 IU/ML
TSH SERPL-ACNC: 2.02 MIU/L (ref 0.44–3.98)

## 2024-03-25 PROCEDURE — 84443 ASSAY THYROID STIM HORMONE: CPT

## 2024-03-25 PROCEDURE — 36415 COLL VENOUS BLD VENIPUNCTURE: CPT

## 2024-03-25 PROCEDURE — 84439 ASSAY OF FREE THYROXINE: CPT

## 2024-03-25 PROCEDURE — 86376 MICROSOMAL ANTIBODY EACH: CPT

## 2024-03-25 PROCEDURE — 99214 OFFICE O/P EST MOD 30 MIN: CPT | Performed by: INTERNAL MEDICINE

## 2024-03-25 NOTE — PROGRESS NOTES
Subjective   Patient ID: Ariana De Santiago is a 48 y.o. female who presents for No chief complaint on file..    HPI   Follow-up visit no chest pain no shortness of breath no palpitations some tiredness was concerned about development of Hashimoto's disease send lesser ability to lose weight despite 3 hours of exercise per week not much iodine in the diet no family history of thyroid disease on other medications associated with iodine related matters    Shx no tobacco    Family history mother recently diagnosed with Sjogren's disease and scleroderma her maternal aunt with lupus (DM, HTN in parents)  Review of Systems    Objective   There were no vitals taken for this visit.    Physical Exam vital signs noted alert and oriented x 3 NCAT no JVD or bruit no lid lag no thyromegaly chest clear to auscultation CV regular rate and rhythm abdomen soft nont nabs extremities no clubbing cyanosis or edema normal distal pulses DTR 2+    Assessment/Plan    impression glucose intolerance hyperlipidemia (TG) fatigue other diagnoses obesity  Plan diet weight loss exercise increase water consumption check TSH free T4 TPO been advised on possibility probability of thyroid related although antibody mediated disease in the past she herself may have had an FRENCH test may review and advise on all of the above while watching carbohydrates increasing the aerobic exercise trending towards weight loss and follow-up based on above  [id number] [id number]

## 2024-04-02 ENCOUNTER — PHARMACY VISIT (OUTPATIENT)
Dept: PHARMACY | Facility: CLINIC | Age: 49
End: 2024-04-02
Payer: COMMERCIAL

## 2024-04-02 ENCOUNTER — APPOINTMENT (OUTPATIENT)
Dept: RADIOLOGY | Facility: HOSPITAL | Age: 49
End: 2024-04-02
Payer: COMMERCIAL

## 2024-04-02 ENCOUNTER — HOSPITAL ENCOUNTER (EMERGENCY)
Facility: HOSPITAL | Age: 49
Discharge: HOME | End: 2024-04-02
Attending: STUDENT IN AN ORGANIZED HEALTH CARE EDUCATION/TRAINING PROGRAM
Payer: COMMERCIAL

## 2024-04-02 VITALS
WEIGHT: 240 LBS | HEART RATE: 90 BPM | TEMPERATURE: 97.8 F | BODY MASS INDEX: 37.67 KG/M2 | DIASTOLIC BLOOD PRESSURE: 89 MMHG | OXYGEN SATURATION: 98 % | RESPIRATION RATE: 18 BRPM | HEIGHT: 67 IN | SYSTOLIC BLOOD PRESSURE: 133 MMHG
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
## 2024-04-02 PROCEDURE — 73523 X-RAY EXAM HIPS BI 5/> VIEWS: CPT | Mod: BILATERAL PROCEDURE | Performed by: STUDENT IN AN ORGANIZED HEALTH CARE EDUCATION/TRAINING PROGRAM

## 2024-04-02 PROCEDURE — 73610 X-RAY EXAM OF ANKLE: CPT | Mod: LEFT SIDE | Performed by: STUDENT IN AN ORGANIZED HEALTH CARE EDUCATION/TRAINING PROGRAM

## 2024-04-02 PROCEDURE — 73564 X-RAY EXAM KNEE 4 OR MORE: CPT | Mod: LEFT SIDE | Performed by: STUDENT IN AN ORGANIZED HEALTH CARE EDUCATION/TRAINING PROGRAM

## 2024-04-02 PROCEDURE — 72131 CT LUMBAR SPINE W/O DYE: CPT | Performed by: RADIOLOGY

## 2024-04-02 PROCEDURE — 73610 X-RAY EXAM OF ANKLE: CPT | Mod: LT

## 2024-04-02 PROCEDURE — 73630 X-RAY EXAM OF FOOT: CPT | Mod: LEFT SIDE | Performed by: STUDENT IN AN ORGANIZED HEALTH CARE EDUCATION/TRAINING PROGRAM

## 2024-04-02 PROCEDURE — RXMED WILLOW AMBULATORY MEDICATION CHARGE

## 2024-04-02 PROCEDURE — 99284 EMERGENCY DEPT VISIT MOD MDM: CPT | Mod: 25

## 2024-04-02 PROCEDURE — 2500000005 HC RX 250 GENERAL PHARMACY W/O HCPCS

## 2024-04-02 PROCEDURE — 73564 X-RAY EXAM KNEE 4 OR MORE: CPT | Mod: LT

## 2024-04-02 PROCEDURE — 96372 THER/PROPH/DIAG INJ SC/IM: CPT

## 2024-04-02 PROCEDURE — 2500000004 HC RX 250 GENERAL PHARMACY W/ HCPCS (ALT 636 FOR OP/ED)

## 2024-04-02 PROCEDURE — 2500000001 HC RX 250 WO HCPCS SELF ADMINISTERED DRUGS (ALT 637 FOR MEDICARE OP)

## 2024-04-02 PROCEDURE — 73523 X-RAY EXAM HIPS BI 5/> VIEWS: CPT

## 2024-04-02 PROCEDURE — 72131 CT LUMBAR SPINE W/O DYE: CPT

## 2024-04-02 PROCEDURE — 99284 EMERGENCY DEPT VISIT MOD MDM: CPT | Performed by: STUDENT IN AN ORGANIZED HEALTH CARE EDUCATION/TRAINING PROGRAM

## 2024-04-02 PROCEDURE — 73630 X-RAY EXAM OF FOOT: CPT | Mod: LT

## 2024-04-02 RX ORDER — ACETAMINOPHEN 500 MG
1000 TABLET ORAL EVERY 6 HOURS PRN
Qty: 30 TABLET | Refills: 0 | Status: SHIPPED | OUTPATIENT
Start: 2024-04-02 | End: 2024-04-02 | Stop reason: WASHOUT

## 2024-04-02 RX ORDER — LIDOCAINE 50 MG/G
1 PATCH TOPICAL DAILY
Qty: 10 PATCH | Refills: 0 | Status: SHIPPED | OUTPATIENT
Start: 2024-04-02

## 2024-04-02 RX ORDER — CYCLOBENZAPRINE HCL 10 MG
5 TABLET ORAL ONCE
Status: COMPLETED | OUTPATIENT
Start: 2024-04-02 | End: 2024-04-02

## 2024-04-02 RX ORDER — KETOROLAC TROMETHAMINE 15 MG/ML
15 INJECTION, SOLUTION INTRAMUSCULAR; INTRAVENOUS ONCE
Status: COMPLETED | OUTPATIENT
Start: 2024-04-02 | End: 2024-04-02

## 2024-04-02 RX ORDER — ACETAMINOPHEN 500 MG
1000 TABLET ORAL EVERY 6 HOURS PRN
Qty: 30 TABLET | Refills: 0 | Status: SHIPPED | OUTPATIENT
Start: 2024-04-02 | End: 2024-04-12

## 2024-04-02 RX ORDER — IBUPROFEN 800 MG/1
800 TABLET ORAL 3 TIMES DAILY
Qty: 21 TABLET | Refills: 0 | Status: SHIPPED | OUTPATIENT
Start: 2024-04-02 | End: 2024-04-09

## 2024-04-02 RX ORDER — LIDOCAINE 560 MG/1
1 PATCH PERCUTANEOUS; TOPICAL; TRANSDERMAL DAILY
Status: DISCONTINUED | OUTPATIENT
Start: 2024-04-02 | End: 2024-04-02 | Stop reason: HOSPADM

## 2024-04-02 RX ORDER — ACETAMINOPHEN 325 MG/1
650 TABLET ORAL ONCE
Status: COMPLETED | OUTPATIENT
Start: 2024-04-02 | End: 2024-04-02

## 2024-04-02 RX ORDER — CYCLOBENZAPRINE HCL 10 MG
10 TABLET ORAL 2 TIMES DAILY PRN
Qty: 20 TABLET | Refills: 0 | Status: SHIPPED | OUTPATIENT
Start: 2024-04-02 | End: 2024-04-12

## 2024-04-02 RX ORDER — KETOROLAC TROMETHAMINE 15 MG/ML
INJECTION, SOLUTION INTRAMUSCULAR; INTRAVENOUS
Status: COMPLETED
Start: 2024-04-02 | End: 2024-04-02

## 2024-04-02 RX ADMIN — ACETAMINOPHEN 650 MG: 325 TABLET ORAL at 10:18

## 2024-04-02 RX ADMIN — KETOROLAC TROMETHAMINE 15 MG: 15 INJECTION, SOLUTION INTRAMUSCULAR; INTRAVENOUS at 10:18

## 2024-04-02 RX ADMIN — CYCLOBENZAPRINE 5 MG: 10 TABLET, FILM COATED ORAL at 10:33

## 2024-04-02 RX ADMIN — LIDOCAINE 1 PATCH: 4 PATCH TOPICAL at 10:17

## 2024-04-02 ASSESSMENT — PAIN - FUNCTIONAL ASSESSMENT
PAIN_FUNCTIONAL_ASSESSMENT: 0-10
PAIN_FUNCTIONAL_ASSESSMENT: 0-10

## 2024-04-02 ASSESSMENT — LIFESTYLE VARIABLES
EVER FELT BAD OR GUILTY ABOUT YOUR DRINKING: NO
TOTAL SCORE: 0
HAVE PEOPLE ANNOYED YOU BY CRITICIZING YOUR DRINKING: NO
EVER HAD A DRINK FIRST THING IN THE MORNING TO STEADY YOUR NERVES TO GET RID OF A HANGOVER: NO
HAVE YOU EVER FELT YOU SHOULD CUT DOWN ON YOUR DRINKING: NO

## 2024-04-02 ASSESSMENT — COLUMBIA-SUICIDE SEVERITY RATING SCALE - C-SSRS
6. HAVE YOU EVER DONE ANYTHING, STARTED TO DO ANYTHING, OR PREPARED TO DO ANYTHING TO END YOUR LIFE?: NO
1. IN THE PAST MONTH, HAVE YOU WISHED YOU WERE DEAD OR WISHED YOU COULD GO TO SLEEP AND NOT WAKE UP?: NO
2. HAVE YOU ACTUALLY HAD ANY THOUGHTS OF KILLING YOURSELF?: NO

## 2024-04-02 ASSESSMENT — PAIN DESCRIPTION - LOCATION: LOCATION: BACK

## 2024-04-02 ASSESSMENT — PAIN SCALES - GENERAL
PAINLEVEL_OUTOF10: 6
PAINLEVEL_OUTOF10: 4

## 2024-04-02 ASSESSMENT — PAIN DESCRIPTION - ORIENTATION: ORIENTATION: MID

## 2024-04-02 NOTE — Clinical Note
Ariana De Santiago was seen and treated in our emergency department on 4/2/2024.  She may return to work on 04/04/2024.       If you have any questions or concerns, please don't hesitate to call.      Kati Nance, DO

## 2024-04-02 NOTE — Clinical Note
Before Your Surgery      Call your surgeon if there is any change in your health. This includes signs of a cold or flu (such as a sore throat, runny nose, cough, rash or fever).    Do not smoke, drink alcohol or take over the counter medicine (unless your surgeon or primary care doctor tells you to) for the 24 hours before and after surgery.    If you take prescribed drugs: Follow your doctor s orders about which medicines to take and which to stop until after surgery.    Eating and drinking prior to surgery: follow the instructions from your surgeon  Take a shower or bath the night before surgery. Use the soap your surgeon gave you to gently clean your skin. If you do not have soap from your surgeon, use your regular soap. Do not shave or scrub the surgery site.  Wear clean pajamas and have clean sheets on your bed.   What is Irritable Bowel Syndrome (IBS)?     Muscle contraction moves food through the digestive tract.      People who have irritable bowel syndrome (IBS) have digestive tracts that react abnormally to certain substances or to stress. This leads to symptoms like cramps, gas, bloating, pain, constipation, and diarrhea. Sometimes called  spastic colon,  IBS is a common condition that is not a disease, but rather a group of symptoms that happen together.  IBS--a motility problem  The muscle movement that passes food through the digestive tract is called motility. When you have IBS, the normal motility of the digestive tract (especially the colon) is disrupted. Motility may speed up, slow down, or become irregular. If stool passes too quickly through the colon, not enough water is absorbed from it. Loose, watery stools (diarrhea) can result. If stool passes through the colon too slowly, too much water is absorbed and the stool becomes hard and dry (constipation). Also, stool and gas may back up and cause painful pressure and cramping. There is no single test that can diagnose IBS. It is a group of  Ariana De Santiago was seen and treated in our emergency department on 4/2/2024.  She may return to work on 04/04/2024.       If you have any questions or concerns, please don't hesitate to call.      Kati Nance, DO "symptoms that help your healthcare provider with the diagnosis. Often multiple blood, stool, radiologic tests, or even colonoscopy are performed in the evaluation of people suspected to have IBS. These are done primarily to make sure that there are no other illnesses that can account for your symptoms.   What causes IBS?  A great deal of research has been done on IBS, but the cause is still not known. Some of the possible factors include:   Smoking, eating certain foods, or drinking alcohol or caffeinated drinks can cause, or \"trigger,\" symptoms of IBS.  Although no one knows for sure, IBS may be caused by a problem with the nerves or muscles in your digestive tract.  There is also some evidence that certain bacteria found after a severe gastroinstestinal infection in the small intestine and colon may cause IBS.  While stress and anxiety worsen the symptoms of IBS, it is not believed to be the cause.   What you can do  Recommendations include:  Certain medicines may help regulate the working of your digestive tract. Your healthcare provider may prescribe one or more for you.  Medicine can t cure IBS, but it may help manage the symptoms.  Because some medicines may make IBS worse, don t take any medicine, especially laxatives, unless your healthcare provider prescribes it for you.  Your healthcare provider may suggest some lifestyle changes to help control your IBS. Two of the most important are changing your diet and managing stress. If diet changes are suggested, ask for nutritional guidance from a dietitian so you maintain a healthy nutritional balance in your food intake.   Date Last Reviewed: 7/1/2016 2000-2017 [employer]. [address] [place] [address], [place] [address]. All rights reserved. This information is not intended as a substitute for professional medical care. Always follow your healthcare professional's instructions.        Irritable Bowel Syndrome    Irritable bowel syndrome (IBS) is a " disorder of the intestines. It is not a disease, but a group of symptoms caused by changes in the way the intestines work. It is fairly common, but the cause is not well understood.  Symptoms of IBS include:  Abdominal pain, discomfort, and cramping  Diarrhea  Constipation or dry, hard stools  Mucous stool  Bloating  Feeling of incomplete bowel movements  It usually results in one of 3 patterns of symptoms:  Chronic abdominal pain and constipation  Recurring episodes of diarrhea, with or without pain  Alternating diarrhea and constipation  Home care  The goal of treatment is to control and relieve your symptoms, so you can lead a full and active life. There is no cure for IBS. But it can be managed.  Diet  Your diet did not cause your IBS, but it can affect it. No one diet works for everyone. Finding the best foods for you may take trial and error. Keep a food log to help find what foods made your symptoms worse. Below are some tips that may help you.  Eat more slowly. Eat smaller amounts at a time, but more often. Remember, you can always eat more, but cannot eat less once you ve eaten too much.  High-fiber foods are complicated. While they may help relieve constipation, they can make your bloating, cramping, gas, and diarrhea worse.  Eat less sugar.  Try cutting out dairy products. Sometimes this helps.  Try cutting out foods that are high in fat and fatty meats.  You can control bloating or passing excess gas. Be careful with  gassy  vegetables and fruits like beans, cabbage, broccoli, and cauliflower.  Be careful with carbonated beverages and fruit juices. They can make your bloating and diarrhea worse.  Caffeine, alcohol, and stimulants can make symptoms worse. These include coffee, tea, sodas, energy drinks, and chocolate.  Lifestyle  Look for factors that seem to worsen your symptoms. These include stress and emotions.   Although stress does not cause IBS, it may trigger flare-ups. Counseling can help you  learn to handle stress. So can self-help measures like exercise, yoga, and meditation.  Depression can occur along with IBS. Your healthcare provider may prescribe antidepressant medicine. This may help with diarrhea, constipation, and cramping, as well as with symptoms of depression.  Smoking doesn't cause IBS, but can make the symptoms worse.  Medicines  Your healthcare provider may prescribe medicines. Take them as directed. For acute flare-ups of your illness, your provider may give you prescription medicines.  Check with your healthcare provider before taking any medicines for diarrhea.  Avoid anti-inflammatory medicines like ibuprofen or naproxen.  Consider nutritional supplements. This is especially true if your diarrhea is prolonged, or you aren't eating or are losing weight  Follow-up care  Follow up with your healthcare provider, or as advised. If a stool sample was taken or cultures were done, you will be told if your treatment needs to change. You can call as directed for the results.  When to seek medical advice  Call your healthcare provider right away if any of these occur:  Abdominal pain gets worse  Constant abdominal pain moves to the right-lower abdomen  You can't keep liquids down because of vomiting  You have severe diarrhea  You have blood (red or black color) or mucus in your stool  You feel very weak or dizzy, faint, or have extreme thirst  You have a fever of 100.4 F (38.0 C) or higher, or as directed by your healthcare provider  Date Last Reviewed: 8/31/2015 2000-2017 [employer]. [address] [place] [address]. All rights reserved. This information is not intended as a substitute for professional medical care. Always follow your healthcare professional's instructions.        Diet and Lifestyle Tips for Irritable Bowel Syndrome (IBS)    Your healthcare professional may suggest some lifestyle changes to help control your IBS. Changing your diet and managing stress  are two of the most important. Follow your healthcare provider s instructions and try some of the suggestions below.  Change your diet  Your diet may be an important cause of IBS symptoms. You may want to try the following:  Pay attention to what foods bother you, and avoid them. For example, dairy products are hard for some people to digest.  Drink 6 to 8 glasses of water a day.  Avoid caffeine and tobacco. These are muscle stimulants and can affect the working of your digestive tract.  Avoid alcohol, which can irritate your digestive tract and make your symptoms worse.  Eat more fiber if constipation is a problem. Fiber makes the stool softer and easier to pass through the colon.  Reduce stress  If stress or anxiety makes your IBS symptoms worse, learning how to manage stress may help you feel better. Try these tips:  Identify the causes of stress in your life.  Learn new ways to cope with them.  Regular exercise is a great way to relieve stress. It can also help ease constipation.  Date Last Reviewed: 7/1/2016 2000-2017 [employer]. [address] [place] [address], [place] [address]. All rights reserved. This information is not intended as a substitute for professional medical care. Always follow your healthcare professional's instructions.

## 2024-04-02 NOTE — ED TRIAGE NOTES
Pt presents to ED after a fall at work on a wet floor, pt having back and left hip pain along with bilat feet pain

## 2024-04-02 NOTE — DISCHARGE INSTRUCTIONS
Please return to the emergency department, should you have any worsening symptoms, are unable to get an appointment with your primary care physician within the discussed time frame, or have any further concerns.     Please follow up with: Primary care physician as needed.    You may take ibuprofen or tylenol for pain. You may alternate ibuprofen and tylenol every 6 hours for better pain control.    Do not exceed 2400mg of ibuprofen or 4000mg of tylenol in a 24 hour period.

## 2024-04-02 NOTE — ED PROVIDER NOTES
HPI:      Limitations to History: None  Additional History Obtained from: N/A  External records reviewed: Prior EMR notes    Chief Complaint   Patient presents with    Fall          Ariana De Santiago is a 48 y.o. female with no prior medical problems presenting to the ED after a mechanical fall at work.  Patient states that she slipped on water, did not notice any signs forward for her, and fell onto her left side.  Afterwards she was able to get up, but noted pain in her lower back and left side.  Presented to the ED.  She did not take any medications prior to arrival.  No prior medical problems, or prior back injuries or surgeries.  She denies any tingling or numbness in her extremities.        Past Medical History:   Diagnosis Date    Endometriosis 09/21/2023    Ob/Gyn: Loren Lombardo, endometriosis s/p [facility] presents to discuss surgical planning.    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     Past Surgical History:   Procedure Laterality Date    HYSTERECTOMY      OTHER SURGICAL HISTORY  01/29/2019    Hysterectomy    OTHER SURGICAL HISTORY  11/26/2014    Ostectomy Calcaneus For Spur    OTHER SURGICAL HISTORY  11/26/2014    Gynecologic Laparoscopy With Adhesiolysis     Social History     Tobacco Use    Smoking status: Never    Smokeless tobacco: Never   Vaping Use    Vaping Use: Never used   Substance Use Topics    Alcohol use: Never    Drug use: Never     No Known Allergies  --------------------------------------------------------------------------------------------------------------------------------------    VS: As  documented in the triage note and EMR flowsheet from this visit were reviewed.  Temp 36.6 °C (97.8 °F) HR 90 /89 RR 18 Sat 98 % on      Physical Exam:  GEN:  no acute distress, appears comfortable. Conversational and appropriate.    HEENT: Normocephalic, atraumatic. Conjunctiva pink with no redness or exudates. Hearing grossly intact. Moist mucous membranes.  CARDIO: Normal rate and regular rhythm. Normal S1, S2  without murmurs, rubs, or gallops.   PULM: Clear to auscultation bilaterally. No rales, rhonchi, or wheezes. No accessory muscle use or stridor. Speaking in full sentences.  GI: Soft, non-tender, non-distended. No rebound tenderness or guarding.   SKIN: Warm and dry, no rashes, lesions, petechiae, or purpura.  MSK: ROM intact in all 4 extremities without contractures.  Tenderness to palpation over the left lateral hip, left lateral knee, and left foot with no malleolar tenderness or midfoot tenderness.  Midline tenderness over the lumbar spine without any obvious bruising, step-offs, or deformities.  Steady gait.  No peripheral edema, contusions, or wounds.    NEURO: A&Ox3, No focal findings identified. No confusion or gross mental status changes.  PSYCH: Appropriate mood and behavior, converses and responds appropriately during exam.   ---------------------------------------------------------------------------------------------------------------------------------------  Given in the ED:   Medications   ketorolac (Toradol) injection 15 mg (15 mg intramuscular Given 4/2/24 1018)   cyclobenzaprine (Flexeril) tablet 5 mg (5 mg oral Given 4/2/24 1033)   acetaminophen (Tylenol) tablet 650 mg (650 mg oral Given 4/2/24 1018)        Work up: All labs and imaging were independently reviewed by me.    CT lumbar spine wo IV contrast   Final Result   Minor bony productive change at the L3/L4 facet joints bilaterally.        CT of the lumbar spine is otherwise within normal limits.        MACRO:   none         Signed by: Ronnie Reid 4/2/2024 12:14 PM   Dictation workstation:   [id number]      XR ankle left 3+ views   Final Result   No acute osseous abnormality of the left foot or left ankle.        I personally reviewed the image(s)/study and resident interpretation.   I agree with the findings as stated by resident Espinoza Wakefield.   Data analyzed and images interpreted at [place], [place].        MACRO:   None        Signed by: Shravan Hussein 4/2/2024 11:21 AM   Dictation workstation:   [id number]      XR knee left 4+ views   Final Result   1. No acute osseous abnormality of the left knee.        I personally reviewed the image(s)/study and resident interpretation.   I agree with the findings as stated by resident Espinoza Wake[place].   Data analyzed and images interpreted at [place], [place].        MACRO:   None.        Signed by: Shravan Hussein 4/2/2024 11:22 AM   Dictation workstation:   [id number]      XR foot left 3+ views   Final Result   No acute osseous abnormality of the left foot or left ankle.        I personally reviewed the image(s)/study and resident interpretation.   I agree with the findings as stated by resident Espinoza [place].   Data analyzed and images interpreted at [place], [place].        MACRO:   None        Signed by: Shravan Hussein 4/2/2024 11:21 AM   Dictation workstation:   [id number]      XR hips bilateral 5+ VW w pelvis when performed   Final Result   1. No acute osseous abnormality of the pelvis or bilateral hips.        I personally reviewed the image(s)/study and resident interpretation.   I agree with the findings as stated by resident Espinoza [place].   Data analyzed and images interpreted at [place], [place].        MACRO:   None.        Signed by: Shravan Hussein 4/2/2024 11:24 AM    Dictation workstation:   [id number]            MDM:  Briefly, this is a 48 y.o. female who was seen here for midline lumbar tenderness and left sided pain after mechanical fall, found to have no acute traumatic injuries.  CT of the L-spine was performed given midline lumbar spine tenderness and was unremarkable.  Patient's pain was controlled with Flexeril, Toradol, and Tylenol in the ED.  On reevaluation, noted that her pain was improved. She has no radicular symptoms, and has a steady gait.  Discussed discharge home with medications, which patient is agreeable to at this time.  Advised to return to the emergency department should she have any further concerns.      Diagnoses as of 04/05/24 1317   Fall, initial encounter     Social Determinants of Health: none identified    Discharge Medications: Flexeril, ibuprofen, Tylenol, and lidocaine patches    Plan and Disposition: Discharge home, advised to return if worse. They understand return precautions and discharge instructions. Patient was in agreement with this plan.        Kati Torres DO  Emergency Medicine, PGY-2    Patient was seen and evaluated by the attending physician. The attending ED physician agrees with the plan. Patient and/or patient´s representative was counseled regarding labs, imaging, likely diagnosis, and plan. All questions were answered.  Disclaimer: This note was dictated by speech recognition.  Attempt at proofreading was made to minimize errors.  Errors in transcription may be present.  Please call if questions.     Kati Torres DO  Resident  04/05/24 [id number]

## 2024-07-28 ASSESSMENT — PROMIS GLOBAL HEALTH SCALE
RATE_QUALITY_OF_LIFE: EXCELLENT
CARRYOUT_SOCIAL_ACTIVITIES: EXCELLENT
RATE_SOCIAL_SATISFACTION: EXCELLENT
RATE_GENERAL_HEALTH: VERY GOOD
RATE_PHYSICAL_HEALTH: VERY GOOD
EMOTIONAL_PROBLEMS: NEVER
CARRYOUT_PHYSICAL_ACTIVITIES: COMPLETELY
RATE_AVERAGE_FATIGUE: MILD
RATE_MENTAL_HEALTH: EXCELLENT
RATE_AVERAGE_PAIN: 0

## 2024-08-01 ENCOUNTER — LAB (OUTPATIENT)
Dept: LAB | Facility: LAB | Age: 49
End: 2024-08-01
Payer: COMMERCIAL

## 2024-08-01 ENCOUNTER — APPOINTMENT (OUTPATIENT)
Dept: PRIMARY CARE | Facility: CLINIC | Age: 49
End: 2024-08-01
Payer: COMMERCIAL

## 2024-08-01 VITALS
DIASTOLIC BLOOD PRESSURE: 75 MMHG | WEIGHT: 245 LBS | HEART RATE: 72 BPM | HEIGHT: 67 IN | BODY MASS INDEX: 38.45 KG/M2 | SYSTOLIC BLOOD PRESSURE: 121 MMHG | RESPIRATION RATE: 12 BRPM

## 2024-08-01 DIAGNOSIS — Z00.00 HEALTH CARE MAINTENANCE: Primary | ICD-10-CM

## 2024-08-01 DIAGNOSIS — R73.02 GLUCOSE INTOLERANCE (IMPAIRED GLUCOSE TOLERANCE): ICD-10-CM

## 2024-08-01 DIAGNOSIS — E78.2 MIXED HYPERLIPIDEMIA: ICD-10-CM

## 2024-08-01 DIAGNOSIS — Z00.00 HEALTH CARE MAINTENANCE: ICD-10-CM

## 2024-08-01 DIAGNOSIS — Z12.31 VISIT FOR SCREENING MAMMOGRAM: ICD-10-CM

## 2024-08-01 DIAGNOSIS — E66.09 CLASS 2 OBESITY DUE TO EXCESS CALORIES WITHOUT SERIOUS COMORBIDITY WITH BODY MASS INDEX (BMI) OF 38.0 TO 38.9 IN ADULT: ICD-10-CM

## 2024-08-01 LAB
ALBUMIN SERPL BCP-MCNC: 4.2 G/DL (ref 3.4–5)
ALP SERPL-CCNC: 92 U/L (ref 33–110)
ALT SERPL W P-5'-P-CCNC: 21 U/L (ref 7–45)
ANION GAP SERPL CALC-SCNC: 11 MMOL/L (ref 10–20)
AST SERPL W P-5'-P-CCNC: 21 U/L (ref 9–39)
BILIRUB SERPL-MCNC: 0.4 MG/DL (ref 0–1.2)
BUN SERPL-MCNC: 9 MG/DL (ref 6–23)
CALCIUM SERPL-MCNC: 9.7 MG/DL (ref 8.6–10.6)
CHLORIDE SERPL-SCNC: 103 MMOL/L (ref 98–107)
CHOLEST SERPL-MCNC: 170 MG/DL (ref 0–199)
CHOLESTEROL/HDL RATIO: 3
CO2 SERPL-SCNC: 31 MMOL/L (ref 21–32)
CREAT SERPL-MCNC: 0.68 MG/DL (ref 0.5–1.05)
EGFRCR SERPLBLD CKD-EPI 2021: >90 ML/MIN/1.73M*2
ERYTHROCYTE [DISTWIDTH] IN BLOOD BY AUTOMATED COUNT: 12.4 % (ref 11.5–14.5)
EST. AVERAGE GLUCOSE BLD GHB EST-MCNC: 111 MG/DL
GLUCOSE SERPL-MCNC: 89 MG/DL (ref 74–99)
HBA1C MFR BLD: 5.5 %
HCT VFR BLD AUTO: 42.9 % (ref 36–46)
HDLC SERPL-MCNC: 57.3 MG/DL
HGB BLD-MCNC: 14 G/DL (ref 12–16)
LDLC SERPL CALC-MCNC: 80 MG/DL
MCH RBC QN AUTO: 29.1 PG (ref 26–34)
MCHC RBC AUTO-ENTMCNC: 32.6 G/DL (ref 32–36)
MCV RBC AUTO: 89 FL (ref 80–100)
NON HDL CHOLESTEROL: 113 MG/DL (ref 0–149)
NRBC BLD-RTO: 0 /100 WBCS (ref 0–0)
PLATELET # BLD AUTO: 228 X10*3/UL (ref 150–450)
POTASSIUM SERPL-SCNC: 4.2 MMOL/L (ref 3.5–5.3)
PROT SERPL-MCNC: 7.1 G/DL (ref 6.4–8.2)
RBC # BLD AUTO: 4.81 X10*6/UL (ref 4–5.2)
SODIUM SERPL-SCNC: 141 MMOL/L (ref 136–145)
TRIGL SERPL-MCNC: 166 MG/DL (ref 0–149)
VLDL: 33 MG/DL (ref 0–40)
WBC # BLD AUTO: 6.4 X10*3/UL (ref 4.4–11.3)

## 2024-08-01 PROCEDURE — 85027 COMPLETE CBC AUTOMATED: CPT

## 2024-08-01 PROCEDURE — 93000 ELECTROCARDIOGRAM COMPLETE: CPT | Performed by: INTERNAL MEDICINE

## 2024-08-01 PROCEDURE — 80053 COMPREHEN METABOLIC PANEL: CPT

## 2024-08-01 PROCEDURE — RXMED WILLOW AMBULATORY MEDICATION CHARGE

## 2024-08-01 PROCEDURE — 83036 HEMOGLOBIN GLYCOSYLATED A1C: CPT

## 2024-08-01 PROCEDURE — 80061 LIPID PANEL: CPT

## 2024-08-01 PROCEDURE — 3008F BODY MASS INDEX DOCD: CPT | Performed by: INTERNAL MEDICINE

## 2024-08-01 PROCEDURE — 99396 PREV VISIT EST AGE 40-64: CPT | Performed by: INTERNAL MEDICINE

## 2024-08-01 RX ORDER — METFORMIN HYDROCHLORIDE 500 MG/1
500 TABLET ORAL
Qty: 180 TABLET | Refills: 0 | Status: SHIPPED | OUTPATIENT
Start: 2024-08-01 | End: 2025-09-05

## 2024-08-01 NOTE — PROGRESS NOTES
Subjective   Patient ID: Ariana De Santiago is a 48 y.o. female who presents for No chief complaint on file..    HPI CPE see updated front sheet no chest pain no shortness of breath no side effect with medications mostly vitamins discussed with prior laboratory results some exercises bowels normal no dysuria    Past medical history glucose intolerance hyperlipidemia    Medications noted and unchanged    Allergies noted and unchanged    Social history no tobacco no alcohol    Family history mother recently with atrial fibrillation    Prevention due for mammogram discussed with colonoscopy some exercise some prior blood work reviewed including her recent thyroid function tests    Review of Systems    Objective   There were no vitals taken for this visit.    Physical Exam vital signs noted alert and oriented x 3 NCAT no JVD or bruit no lid lag no thyromegaly PERRLA EOMI nares without discharge OP benign TM normal bilateral EAC clear bilateral no AC nodes chest clear to auscultation CV regular rate and rhythm S1-S2 without murmur gallop or rub abdomen soft obese nontender normal active bowel sounds LS spine normal curvature negative straight leg raise negative logroll negative SI joint tenderness extremities no clubbing cyanosis or edema normal distal pulses DTR 2+    Assessment/Plan    impression General medical examination obesity glucose intolerance hyperlipidemia  Plan check EKG by request advised on heart check mammogram requisition made check comprehensive panel advised on glucose potassium and kidney function advised on blood sugar check hepatic panel advised on liver enzymes as part of comprehensive panel check CBC advised on blood count advised on colon screening okay for metformin 500 mg p.o. twice daily #180 refill 0 by request in preparation for if would like to use week OV or a different medication as such increase water consumption to go with diet and exercise previous thyroid reviewed check lipid panel  advised on cholesterol profile recheck 1 to 3 months based on above TT 50 cc 20

## 2024-08-02 ENCOUNTER — PHARMACY VISIT (OUTPATIENT)
Dept: PHARMACY | Facility: CLINIC | Age: 49
End: 2024-08-02
Payer: COMMERCIAL

## 2024-08-28 ENCOUNTER — HOSPITAL ENCOUNTER (OUTPATIENT)
Dept: RADIOLOGY | Facility: CLINIC | Age: 49
Discharge: HOME | End: 2024-08-28
Payer: COMMERCIAL

## 2024-08-28 VITALS — WEIGHT: 244.71 LBS | HEIGHT: 67 IN | BODY MASS INDEX: 38.41 KG/M2

## 2024-08-28 DIAGNOSIS — Z12.31 VISIT FOR SCREENING MAMMOGRAM: ICD-10-CM

## 2024-08-28 PROCEDURE — 77067 SCR MAMMO BI INCL CAD: CPT | Performed by: RADIOLOGY

## 2024-08-28 PROCEDURE — 77063 BREAST TOMOSYNTHESIS BI: CPT | Performed by: RADIOLOGY

## 2024-08-28 PROCEDURE — 77067 SCR MAMMO BI INCL CAD: CPT

## 2024-09-27 DIAGNOSIS — N39.0 URINARY TRACT INFECTION WITHOUT HEMATURIA, SITE UNSPECIFIED: Primary | ICD-10-CM

## 2024-09-27 RX ORDER — NITROFURANTOIN 25; 75 MG/1; MG/1
100 CAPSULE ORAL 2 TIMES DAILY
Qty: 10 CAPSULE | Refills: 0 | Status: SHIPPED | OUTPATIENT
Start: 2024-09-27 | End: 2024-10-03

## 2024-09-28 ENCOUNTER — PHARMACY VISIT (OUTPATIENT)
Dept: PHARMACY | Facility: CLINIC | Age: 49
End: 2024-09-28
Payer: COMMERCIAL

## 2024-09-28 PROCEDURE — RXMED WILLOW AMBULATORY MEDICATION CHARGE

## 2024-10-24 DIAGNOSIS — R73.02 GLUCOSE INTOLERANCE (IMPAIRED GLUCOSE TOLERANCE): ICD-10-CM

## 2024-10-26 PROCEDURE — RXMED WILLOW AMBULATORY MEDICATION CHARGE

## 2024-10-26 RX ORDER — METFORMIN HYDROCHLORIDE 500 MG/1
500 TABLET ORAL
Qty: 180 TABLET | Refills: 0 | Status: SHIPPED | OUTPATIENT
Start: 2024-10-26 | End: 2025-11-30

## 2024-10-30 ENCOUNTER — PHARMACY VISIT (OUTPATIENT)
Dept: PHARMACY | Facility: CLINIC | Age: 49
End: 2024-10-30
Payer: COMMERCIAL

## 2025-01-22 DIAGNOSIS — R73.02 GLUCOSE INTOLERANCE (IMPAIRED GLUCOSE TOLERANCE): ICD-10-CM

## 2025-01-24 PROCEDURE — RXMED WILLOW AMBULATORY MEDICATION CHARGE

## 2025-01-24 RX ORDER — METFORMIN HYDROCHLORIDE 500 MG/1
500 TABLET ORAL
Qty: 180 TABLET | Refills: 0 | Status: SHIPPED | OUTPATIENT
Start: 2025-01-24 | End: 2026-02-28

## 2025-01-29 ENCOUNTER — PHARMACY VISIT (OUTPATIENT)
Dept: PHARMACY | Facility: CLINIC | Age: 50
End: 2025-01-29
Payer: COMMERCIAL

## 2025-02-26 ENCOUNTER — APPOINTMENT (OUTPATIENT)
Dept: OPHTHALMOLOGY | Facility: CLINIC | Age: 50
End: 2025-02-26
Payer: COMMERCIAL

## 2025-02-26 DIAGNOSIS — H52.4 MYOPIA WITH PRESBYOPIA OF BOTH EYES: ICD-10-CM

## 2025-02-26 DIAGNOSIS — H52.13 MYOPIA WITH PRESBYOPIA OF BOTH EYES: ICD-10-CM

## 2025-02-26 DIAGNOSIS — H35.412 LATTICE DEGENERATION OF LEFT RETINA: Primary | ICD-10-CM

## 2025-02-26 PROCEDURE — 92014 COMPRE OPH EXAM EST PT 1/>: CPT | Performed by: OPTOMETRIST

## 2025-02-26 PROCEDURE — 92310 CONTACT LENS FITTING OU: CPT | Performed by: OPTOMETRIST

## 2025-02-26 PROCEDURE — 92015 DETERMINE REFRACTIVE STATE: CPT | Performed by: OPTOMETRIST

## 2025-02-26 ASSESSMENT — ENCOUNTER SYMPTOMS
CARDIOVASCULAR NEGATIVE: 0
ENDOCRINE NEGATIVE: 0
PSYCHIATRIC NEGATIVE: 0
GASTROINTESTINAL NEGATIVE: 0
MUSCULOSKELETAL NEGATIVE: 0
NEUROLOGICAL NEGATIVE: 0
RESPIRATORY NEGATIVE: 0
EYES NEGATIVE: 0
CONSTITUTIONAL NEGATIVE: 0
HEMATOLOGIC/LYMPHATIC NEGATIVE: 0
ALLERGIC/IMMUNOLOGIC NEGATIVE: 0

## 2025-02-26 ASSESSMENT — CONF VISUAL FIELD
OS_INFERIOR_TEMPORAL_RESTRICTION: 0
OS_SUPERIOR_TEMPORAL_RESTRICTION: 0
METHOD: COUNTING FINGERS
OD_INFERIOR_NASAL_RESTRICTION: 0
OS_NORMAL: 1
OS_INFERIOR_NASAL_RESTRICTION: 0
OD_NORMAL: 1
OS_SUPERIOR_NASAL_RESTRICTION: 0
OD_INFERIOR_TEMPORAL_RESTRICTION: 0
OD_SUPERIOR_NASAL_RESTRICTION: 0
OD_SUPERIOR_TEMPORAL_RESTRICTION: 0

## 2025-02-26 ASSESSMENT — REFRACTION_CURRENTRX
OS_BRAND: BIOMEDICS 55 PREMIER UV
OS_DIAMETER: 14.2
OS_SPHERE: -1.25
OD_SPHERE: -2.75
OD_BRAND: BIOMEDICS 55 PREMIER UV
OS_CYLINDER: SPHERE
OS_DIAMETER: 14.0
OS_BRAND: BIOFINITY ENERGYS
OD_CYLINDER: SPHERE
OS_SPHERE: -1.75
OS_BASECURVE: 8.6
OD_SPHERE: -2.50
OD_DIAMETER: 14.2
OS_CYLINDER: SPHERE
OD_BRAND: BIOFINITY ENERGYS
OD_BASECURVE: 8.6
OS_BASECURVE: 8.6
OD_BASECURVE: 8.6
OD_CYLINDER: SPHERE
OD_DIAMETER: 14.0

## 2025-02-26 ASSESSMENT — REFRACTION_MANIFEST
OD_SPHERE: -2.50
OS_ADD: +1.50
OD_ADD: +1.50
OS_SPHERE: -1.25
OS_CYLINDER: SPHERE
OD_CYLINDER: SPHERE

## 2025-02-26 ASSESSMENT — CUP TO DISC RATIO
OS_RATIO: .4
OD_RATIO: .4

## 2025-02-26 ASSESSMENT — VISUAL ACUITY
VA_OR_OS_CURRENT_RX: 20/25
METHOD: SNELLEN - LINEAR
VA_OR_OD_CURRENT_RX: 20/25
OS_CC: 20/30
VA_OR_OS_CURRENT_RX: 20/25
CORRECTION_TYPE: CONTACTS
OD_CC+: -2
VA_OR_OD_CURRENT_RX: 20/25
OD_CC: 20/25

## 2025-02-26 ASSESSMENT — TONOMETRY
IOP_METHOD: GOLDMANN APPLANATION
OS_IOP_MMHG: 14
OD_IOP_MMHG: 12

## 2025-02-26 ASSESSMENT — EXTERNAL EXAM - LEFT EYE: OS_EXAM: NORMAL

## 2025-02-26 ASSESSMENT — REFRACTION_WEARINGRX
OD_ADD: +1.50
OS_ADD: +1.50
OD_SPHERE: -3.00
OS_AXIS: 165
OS_CYLINDER: -0.50
OS_SPHERE: -1.25

## 2025-02-26 ASSESSMENT — SLIT LAMP EXAM - LIDS
COMMENTS: GOOD POSITION
COMMENTS: GOOD POSITION

## 2025-02-26 ASSESSMENT — EXTERNAL EXAM - RIGHT EYE: OD_EXAM: NORMAL

## 2025-07-28 ASSESSMENT — PROMIS GLOBAL HEALTH SCALE
CARRYOUT_PHYSICAL_ACTIVITIES: COMPLETELY
RATE_AVERAGE_PAIN: 0
CARRYOUT_SOCIAL_ACTIVITIES: VERY GOOD
RATE_AVERAGE_FATIGUE: MILD
RATE_MENTAL_HEALTH: VERY GOOD
RATE_SOCIAL_SATISFACTION: VERY GOOD
EMOTIONAL_PROBLEMS: NEVER
RATE_QUALITY_OF_LIFE: VERY GOOD
RATE_PHYSICAL_HEALTH: VERY GOOD
RATE_GENERAL_HEALTH: VERY GOOD

## 2025-08-01 ENCOUNTER — APPOINTMENT (OUTPATIENT)
Dept: PRIMARY CARE | Facility: CLINIC | Age: 50
End: 2025-08-01
Payer: COMMERCIAL

## 2025-08-04 ENCOUNTER — APPOINTMENT (OUTPATIENT)
Dept: PRIMARY CARE | Facility: CLINIC | Age: 50
End: 2025-08-04
Payer: COMMERCIAL

## 2025-08-04 VITALS
RESPIRATION RATE: 12 BRPM | HEART RATE: 68 BPM | DIASTOLIC BLOOD PRESSURE: 78 MMHG | BODY MASS INDEX: 36.88 KG/M2 | WEIGHT: 235 LBS | HEIGHT: 67 IN | SYSTOLIC BLOOD PRESSURE: 124 MMHG

## 2025-08-04 DIAGNOSIS — R73.02 GLUCOSE INTOLERANCE (IMPAIRED GLUCOSE TOLERANCE): ICD-10-CM

## 2025-08-04 DIAGNOSIS — E78.2 MIXED HYPERLIPIDEMIA: ICD-10-CM

## 2025-08-04 DIAGNOSIS — Z00.00 HEALTH CARE MAINTENANCE: Primary | ICD-10-CM

## 2025-08-04 DIAGNOSIS — R00.2 PALPITATION: ICD-10-CM

## 2025-08-04 DIAGNOSIS — Z12.31 VISIT FOR SCREENING MAMMOGRAM: ICD-10-CM

## 2025-08-04 PROCEDURE — 99396 PREV VISIT EST AGE 40-64: CPT | Performed by: INTERNAL MEDICINE

## 2025-08-04 PROCEDURE — RXMED WILLOW AMBULATORY MEDICATION CHARGE

## 2025-08-04 PROCEDURE — 93000 ELECTROCARDIOGRAM COMPLETE: CPT | Performed by: INTERNAL MEDICINE

## 2025-08-04 PROCEDURE — 3008F BODY MASS INDEX DOCD: CPT | Performed by: INTERNAL MEDICINE

## 2025-08-04 RX ORDER — TRIAMCINOLONE ACETONIDE 1 MG/G
CREAM TOPICAL 2 TIMES DAILY
Qty: 30 G | Refills: 0 | Status: SHIPPED | OUTPATIENT
Start: 2025-08-04

## 2025-08-04 RX ORDER — METFORMIN HYDROCHLORIDE 1000 MG/1
1000 TABLET ORAL
Qty: 180 TABLET | Refills: 0 | Status: SHIPPED | OUTPATIENT
Start: 2025-08-04 | End: 2026-09-08

## 2025-08-04 NOTE — PROGRESS NOTES
Subjective   Patient ID: Ariana De Santiago is a 49 y.o. female who presents for No chief complaint on file..    HPI CPE see updated front sheet no chest pain no shortness of breath no side effect with medications Metformin has been working for her would like to increase the dose no polyuria polydipsia had lost some weight trying to exercise bowels normal no dysuria has a rash on her left shin     Past medical history glucose intolerance hyperlipidemia    Medications noted and unchanged    Allergies noted and unchanged    Social history no tobacco no alcohol    Family history mother recently with atrial fibrillation now with SLE    Prevention due for mammogram discussed with colonoscopy exercises prior blood work reviewed/  Review of Systems    Objective   There were no vitals taken for this visit.    Physical Exam vital signs noted alert and oriented x 3 NCAT no JVD or bruit no lid lag no thyromegaly PERRLA EOMI nares without discharge OP benign TM normal bilateral EAC clear bilateral no AC nodes chest clear to auscultation and percussion CV regular rate and rhythm S1-S2 without murmur gallop or rub abdomen soft obese nontender normal active bowel sounds LS spine normal curvature negative straight leg raise negative logroll negative SI joint tenderness extremities no clubbing cyanosis or edema normal distal pulses DTR 2+ ski the leftn tibia there is an urticarial raised rash    Assessment/Plan    impression General medical examination obesity glucose intolerance hyperlipidemia palpitation rash  Plan check EKG advised on heart had been having some heart skips or palpitations okay to increase metformin see EMR check comprehensive panel advised on glucose potassium and kidney function as well as liver function check CBC advised on blood count and if white blood cells show anything regarding the rash okay for triamcinolone cream see EMR okay for mammogram requisition made advised on initial colonoscopy check lipid panel  advised on cholesterol profile check TSH by request regarding the rash check A1c advised on long-term blood sugar test recheck 3 months more regularly based on labs and based on above TT 50 cc 26      3.  To dermatology if no better

## 2025-08-05 ENCOUNTER — PHARMACY VISIT (OUTPATIENT)
Dept: PHARMACY | Facility: CLINIC | Age: 50
End: 2025-08-05
Payer: COMMERCIAL

## 2025-08-05 LAB
ALBUMIN SERPL-MCNC: 4.4 G/DL (ref 3.6–5.1)
ALP SERPL-CCNC: 82 U/L (ref 31–125)
ALT SERPL-CCNC: 21 U/L (ref 6–29)
ANION GAP SERPL CALCULATED.4IONS-SCNC: 15 MMOL/L (CALC) (ref 7–17)
AST SERPL-CCNC: 20 U/L (ref 10–35)
BASOPHILS # BLD AUTO: 92 CELLS/UL (ref 0–200)
BASOPHILS NFR BLD AUTO: 1.4 %
BILIRUB SERPL-MCNC: 0.4 MG/DL (ref 0.2–1.2)
BUN SERPL-MCNC: 12 MG/DL (ref 7–25)
CALCIUM SERPL-MCNC: 9.7 MG/DL (ref 8.6–10.2)
CHLORIDE SERPL-SCNC: 103 MMOL/L (ref 98–110)
CHOLEST SERPL-MCNC: 193 MG/DL
CHOLEST/HDLC SERPL: 2.9 (CALC)
CO2 SERPL-SCNC: 24 MMOL/L (ref 20–32)
CREAT SERPL-MCNC: 0.71 MG/DL (ref 0.5–0.99)
EGFRCR SERPLBLD CKD-EPI 2021: 105 ML/MIN/1.73M2
EOSINOPHIL # BLD AUTO: 475 CELLS/UL (ref 15–500)
EOSINOPHIL NFR BLD AUTO: 7.2 %
ERYTHROCYTE [DISTWIDTH] IN BLOOD BY AUTOMATED COUNT: 13.2 % (ref 11–15)
EST. AVERAGE GLUCOSE BLD GHB EST-MCNC: 117 MG/DL
EST. AVERAGE GLUCOSE BLD GHB EST-SCNC: 6.5 MMOL/L
GLUCOSE SERPL-MCNC: 84 MG/DL (ref 65–99)
HBA1C MFR BLD: 5.7 %
HCT VFR BLD AUTO: 44 % (ref 35–45)
HDLC SERPL-MCNC: 66 MG/DL
HGB BLD-MCNC: 14.2 G/DL (ref 11.7–15.5)
LDLC SERPL CALC-MCNC: 102 MG/DL (CALC)
LYMPHOCYTES # BLD AUTO: 2594 CELLS/UL (ref 850–3900)
LYMPHOCYTES NFR BLD AUTO: 39.3 %
MCH RBC QN AUTO: 29.3 PG (ref 27–33)
MCHC RBC AUTO-ENTMCNC: 32.3 G/DL (ref 32–36)
MCV RBC AUTO: 90.7 FL (ref 80–100)
MONOCYTES # BLD AUTO: 502 CELLS/UL (ref 200–950)
MONOCYTES NFR BLD AUTO: 7.6 %
NEUTROPHILS # BLD AUTO: 2937 CELLS/UL (ref 1500–7800)
NEUTROPHILS NFR BLD AUTO: 44.5 %
NONHDLC SERPL-MCNC: 127 MG/DL (CALC)
PLATELET # BLD AUTO: 224 THOUSAND/UL (ref 140–400)
PMV BLD REES-ECKER: 11.1 FL (ref 7.5–12.5)
POTASSIUM SERPL-SCNC: 4.6 MMOL/L (ref 3.5–5.3)
PROT SERPL-MCNC: 7.1 G/DL (ref 6.1–8.1)
RBC # BLD AUTO: 4.85 MILLION/UL (ref 3.8–5.1)
SODIUM SERPL-SCNC: 142 MMOL/L (ref 135–146)
TRIGL SERPL-MCNC: 145 MG/DL
TSH SERPL-ACNC: 2.43 MIU/L
WBC # BLD AUTO: 6.6 THOUSAND/UL (ref 3.8–10.8)

## 2025-08-29 ENCOUNTER — APPOINTMENT (OUTPATIENT)
Dept: RADIOLOGY | Facility: CLINIC | Age: 50
End: 2025-08-29
Payer: COMMERCIAL

## 2025-08-29 VITALS — HEIGHT: 66 IN | WEIGHT: 235.89 LBS | BODY MASS INDEX: 37.91 KG/M2

## 2025-08-29 DIAGNOSIS — Z12.31 VISIT FOR SCREENING MAMMOGRAM: ICD-10-CM

## 2025-08-29 PROCEDURE — 77067 SCR MAMMO BI INCL CAD: CPT

## 2026-03-03 ENCOUNTER — APPOINTMENT (OUTPATIENT)
Dept: OPHTHALMOLOGY | Facility: CLINIC | Age: 51
End: 2026-03-03
Payer: COMMERCIAL

## 2026-08-05 ENCOUNTER — APPOINTMENT (OUTPATIENT)
Dept: PRIMARY CARE | Facility: CLINIC | Age: 51
End: 2026-08-05
Payer: COMMERCIAL

## (undated) DEVICE — SUTURE, V-LOC, 2-0, 6IN, GS-22, GREEN

## (undated) DEVICE — SUTURE, VICRYL, 0, 27 IN, UR-6, VIOLET

## (undated) DEVICE — SEAL, UNIVERSAL 5-8MM  XI

## (undated) DEVICE — MANIFOLD, 4 PORT NEPTUNE STANDARD

## (undated) DEVICE — TUBING SET, TRI-LUMEN, FILTERED, F/AIRSEAL

## (undated) DEVICE — Device

## (undated) DEVICE — DRAPE, LEGGINGS, 48 X 31 IN, STERILE, LF

## (undated) DEVICE — DRAPE, ARM XI

## (undated) DEVICE — SUTURE, V-LOC 3-0 CV-23 6 GR 180 ABS"

## (undated) DEVICE — ADHESIVE, SKIN, MASTISOL, 2/3 CC VIAL

## (undated) DEVICE — COVER, TIP HOT SHEARS ENDOWRIST

## (undated) DEVICE — CATHETER, URETHRAL, FOLEY, 2 WAY, 16 FR, 5 CC, SILICONE

## (undated) DEVICE — DRAPE, UNDERBUTTOCKS

## (undated) DEVICE — DRESSING, NON-ADHERENT, TELFA, OUCHLESS, 3 X 8 IN, STERILE

## (undated) DEVICE — KIT, ROBOTIC, CUSTOM UHC

## (undated) DEVICE — BAG, DRAIN METER, URINE, 350CC, LF

## (undated) DEVICE — DRAPE, PAD, PREP, W/ 9 IN CUFF, 24 X 41, LF, NS

## (undated) DEVICE — DRESSING, NON-ADHERENT, TELFA, 3 X 8 IN, NS

## (undated) DEVICE — SYRINGE, 60 CC, IRRIGATION, BULB, CONTRO-BULB, PAPER POUCH

## (undated) DEVICE — TOWELS 4-PK

## (undated) DEVICE — OBTURATOR, BLADELESS 8MM

## (undated) DEVICE — STRIP, SKIN CLOSURE, STERI STRIP, REINFORCED, 0.5 X 4 IN

## (undated) DEVICE — APPLICATOR, ENDOSCOPIC FLOSEAL

## (undated) DEVICE — IRRIGATION SET, CYSTOSCOPY, F/CONSTANT/INTERMITTENT, 8 GTT/CC, 77 IN

## (undated) DEVICE — PROTECTOR, NERVE, ULNAR, PINK

## (undated) DEVICE — POSITIONING, THE PINK PAD, PIGAZZI SYSTEM

## (undated) DEVICE — GOWN, SURGICAL, SMARTGOWN, XLARGE, STERILE

## (undated) DEVICE — DRAPE PACK, LAVH, W/ATTACHED LEGGINGS, W/POUCH, 100 X 114 IN, LF, STERILE

## (undated) DEVICE — DRAPE, COLUMN, DAVINCI XI

## (undated) DEVICE — PREP TRAY, SKIN, DRY, W/GLOVES

## (undated) DEVICE — SUTURE, MONOCRYL PLUS, 4-0, PS-2 UD 27IN

## (undated) DEVICE — DRESSING, ADHESIVE, ISLAND, TELFA, 2 X 3.75 IN, LF

## (undated) DEVICE — SYRINGE, 60 CC, LUER LOCK, MONOJECT

## (undated) DEVICE — SEALANT, HEMOSTATIC, FLOSEAL, 10 ML